# Patient Record
Sex: MALE | Race: WHITE | NOT HISPANIC OR LATINO | Employment: OTHER | URBAN - METROPOLITAN AREA
[De-identification: names, ages, dates, MRNs, and addresses within clinical notes are randomized per-mention and may not be internally consistent; named-entity substitution may affect disease eponyms.]

---

## 2017-07-24 ENCOUNTER — OFFICE VISIT (OUTPATIENT)
Dept: UROLOGY | Facility: CLINIC | Age: 57
End: 2017-07-24
Payer: COMMERCIAL

## 2017-07-24 VITALS
DIASTOLIC BLOOD PRESSURE: 73 MMHG | HEART RATE: 70 BPM | HEIGHT: 72 IN | SYSTOLIC BLOOD PRESSURE: 123 MMHG | WEIGHT: 160.94 LBS | BODY MASS INDEX: 21.8 KG/M2

## 2017-07-24 DIAGNOSIS — N40.1 HYPERTROPHY OF PROSTATE WITH URINARY OBSTRUCTION AND OTHER LOWER URINARY TRACT SYMPTOMS (LUTS): ICD-10-CM

## 2017-07-24 DIAGNOSIS — R39.12 WEAK URINE STREAM: ICD-10-CM

## 2017-07-24 DIAGNOSIS — R97.20 ELEVATED PSA: Primary | ICD-10-CM

## 2017-07-24 DIAGNOSIS — N13.8 HYPERTROPHY OF PROSTATE WITH URINARY OBSTRUCTION AND OTHER LOWER URINARY TRACT SYMPTOMS (LUTS): ICD-10-CM

## 2017-07-24 LAB
BILIRUB SERPL-MCNC: NORMAL MG/DL
BLOOD URINE, POC: NORMAL
COLOR, POC UA: YELLOW
GLUCOSE UR QL STRIP: NORMAL
KETONES UR QL STRIP: NORMAL
LEUKOCYTE ESTERASE URINE, POC: NORMAL
NITRITE, POC UA: NORMAL
PH, POC UA: 5
PROTEIN, POC: NORMAL
SPECIFIC GRAVITY, POC UA: 1.03
UROBILINOGEN, POC UA: NORMAL

## 2017-07-24 PROCEDURE — 99999 PR PBB SHADOW E&M-EST. PATIENT-LVL III: CPT | Mod: PBBFAC,,, | Performed by: UROLOGY

## 2017-07-24 PROCEDURE — 99214 OFFICE O/P EST MOD 30 MIN: CPT | Mod: 25,S$GLB,, | Performed by: UROLOGY

## 2017-07-24 PROCEDURE — 81002 URINALYSIS NONAUTO W/O SCOPE: CPT | Mod: S$GLB,,, | Performed by: UROLOGY

## 2017-07-24 RX ORDER — POTASSIUM CHLORIDE 750 MG/1
10 CAPSULE, EXTENDED RELEASE ORAL EVERY MORNING
COMMUNITY
End: 2023-01-06 | Stop reason: ALTCHOICE

## 2017-07-24 RX ORDER — FINASTERIDE 5 MG/1
5 TABLET, FILM COATED ORAL DAILY
Qty: 30 TABLET | Refills: 12 | Status: SHIPPED | OUTPATIENT
Start: 2017-07-24 | End: 2021-07-23

## 2017-07-24 RX ORDER — LANOLIN ALCOHOL/MO/W.PET/CERES
1 CREAM (GRAM) TOPICAL 2 TIMES DAILY
COMMUNITY
End: 2021-12-02

## 2017-07-24 NOTE — PROGRESS NOTES
Subjective:       Patient ID: Lore Viramontes is a 57 y.o. male.    Chief Complaint: Follow-up    HPI     A 57-year-old male with a history of elevated PSA.  He underwent a benign prostate biopsy in February 2014 his PSA at that time was 5.3.  He also has BPH and his AUA symptom score is 23 he has been on Uroxatral which was initially effective but now he is having worsening problems.  His most recent PSA is 3.7.   He also has ED and was previously given a trial of Cialis but he had severe flushing and he is no longer taking any ED meds.  Urine dipstick shows negative for all components.    Review of Systems   Constitutional: Negative for fever.   Genitourinary: Negative for dysuria and hematuria.       Objective:      Physical Exam   Constitutional: He is oriented to person, place, and time. He appears well-developed and well-nourished.   HENT:   Head: Normocephalic and atraumatic.   Eyes: Conjunctivae are normal.   Cardiovascular: Normal rate.    Pulmonary/Chest: Effort normal.   Abdominal: Soft.   Genitourinary: Rectal exam shows no mass and anal tone normal. Prostate is enlarged (40g s/s/a). Prostate is not tender.   Musculoskeletal: Normal range of motion.   Neurological: He is alert and oriented to person, place, and time.   Skin: Skin is warm and dry. No rash noted.   Psychiatric: He has a normal mood and affect.   Vitals reviewed.      Assessment:       1. Elevated PSA    2. Hypertrophy of prostate with urinary obstruction and other lower urinary tract symptoms (LUTS)    3. Weak urine stream        Plan:       Elevated PSA  -     POCT URINE DIPSTICK WITHOUT MICROSCOPE  -     Prostate Specific Antigen, Diagnostic; Future; Expected date: 07/24/2017  -     Cystoscopy; Future    Hypertrophy of prostate with urinary obstruction and other lower urinary tract symptoms (LUTS)  -     finasteride (PROSCAR) 5 mg tablet; Take 1 tablet (5 mg total) by mouth once daily.  Dispense: 30 tablet; Refill: 12  -     Cystoscopy;  Future    Weak urine stream  -     Cystoscopy; Future      Add finasteride.  F/u for cysto.

## 2017-08-07 ENCOUNTER — PROCEDURE VISIT (OUTPATIENT)
Dept: UROLOGY | Facility: CLINIC | Age: 57
End: 2017-08-07
Payer: COMMERCIAL

## 2017-08-07 VITALS
BODY MASS INDEX: 22.09 KG/M2 | WEIGHT: 163.13 LBS | SYSTOLIC BLOOD PRESSURE: 131 MMHG | DIASTOLIC BLOOD PRESSURE: 79 MMHG | HEIGHT: 72 IN | HEART RATE: 63 BPM

## 2017-08-07 DIAGNOSIS — N40.1 HYPERTROPHY OF PROSTATE WITH URINARY OBSTRUCTION AND OTHER LOWER URINARY TRACT SYMPTOMS (LUTS): ICD-10-CM

## 2017-08-07 DIAGNOSIS — R97.20 ELEVATED PSA: ICD-10-CM

## 2017-08-07 DIAGNOSIS — N13.8 HYPERTROPHY OF PROSTATE WITH URINARY OBSTRUCTION AND OTHER LOWER URINARY TRACT SYMPTOMS (LUTS): ICD-10-CM

## 2017-08-07 DIAGNOSIS — R39.12 WEAK URINE STREAM: ICD-10-CM

## 2017-08-07 PROCEDURE — 52000 CYSTOURETHROSCOPY: CPT | Mod: S$GLB,,, | Performed by: UROLOGY

## 2017-08-07 PROCEDURE — 81002 URINALYSIS NONAUTO W/O SCOPE: CPT | Mod: S$GLB,,, | Performed by: UROLOGY

## 2017-08-07 NOTE — PROCEDURES
"Cystoscopy  Date/Time: 8/7/2017 4:33 PM  Performed by: REAGNA VALDEZ  Authorized by: REAGAN VALDEZ     Consent Done?:  Yes (Written)  Time out: Immediately prior to procedure a "time out" was called to verify the correct patient, procedure, equipment, support staff and site/side marked as required.    Indications: BPH    Position:  Supine  Anesthesia:  Lidocaine jelly  Patient sedated?: No    Preparation: Patient was prepped and draped in usual sterile fashion      Scope type:  Flexible cystoscope    Patient tolerance:  Patient tolerated the procedure well with no immediate complications      The flexible cystoscope was placed into the urethra and carefully advanced into the bladder.  A careful cystoscopic exam was then performed.  The entire bladder mucosa was systematically visualized.  Findings include moderate bladder wall trabeculation.  There were no lesions, masses foreign bodies or stones.   Each ureteral orifices were visualized and both had clear efflux of urine.   There was a moderate size intravesical prostate.  The cystoscope was then removed and I examined the entire length of the urethra.  There was moderate trilobar enlargement of the prostate otherwise the urethra appeared normal.  He tolerated the procedure well.  There were no complications    Impression:  Normal cystoscopy except BPH    Plan:  We discussed TURP which he will consider but he wants to continue medical therapy for now.  Continue Flomax and Finasteride.    "

## 2017-08-16 DIAGNOSIS — N40.1 BENIGN PROSTATIC HYPERPLASIA WITH URINARY OBSTRUCTION: ICD-10-CM

## 2017-08-16 DIAGNOSIS — N13.8 BENIGN PROSTATIC HYPERPLASIA WITH URINARY OBSTRUCTION: ICD-10-CM

## 2017-08-16 RX ORDER — ALFUZOSIN HYDROCHLORIDE 10 MG/1
10 TABLET, EXTENDED RELEASE ORAL NIGHTLY
Qty: 30 TABLET | Refills: 11 | Status: SHIPPED | OUTPATIENT
Start: 2017-08-16 | End: 2018-08-13 | Stop reason: SDUPTHER

## 2018-01-08 ENCOUNTER — LAB VISIT (OUTPATIENT)
Dept: LAB | Facility: HOSPITAL | Age: 58
End: 2018-01-08
Attending: UROLOGY
Payer: COMMERCIAL

## 2018-01-08 ENCOUNTER — OFFICE VISIT (OUTPATIENT)
Dept: UROLOGY | Facility: CLINIC | Age: 58
End: 2018-01-08
Payer: COMMERCIAL

## 2018-01-08 VITALS
HEART RATE: 69 BPM | BODY MASS INDEX: 22.25 KG/M2 | DIASTOLIC BLOOD PRESSURE: 64 MMHG | SYSTOLIC BLOOD PRESSURE: 120 MMHG | WEIGHT: 164.25 LBS | HEIGHT: 72 IN

## 2018-01-08 DIAGNOSIS — R97.20 ELEVATED PSA: Primary | ICD-10-CM

## 2018-01-08 DIAGNOSIS — N40.1 ENLARGED PROSTATE WITH URINARY OBSTRUCTION: ICD-10-CM

## 2018-01-08 DIAGNOSIS — N13.8 ENLARGED PROSTATE WITH URINARY OBSTRUCTION: ICD-10-CM

## 2018-01-08 DIAGNOSIS — R97.20 ELEVATED PSA: ICD-10-CM

## 2018-01-08 LAB
BILIRUB SERPL-MCNC: NORMAL MG/DL
BLOOD URINE, POC: NORMAL
COLOR, POC UA: YELLOW
COMPLEXED PSA SERPL-MCNC: 3.6 NG/ML
GLUCOSE UR QL STRIP: NORMAL
KETONES UR QL STRIP: NORMAL
LEUKOCYTE ESTERASE URINE, POC: NORMAL
NITRITE, POC UA: NORMAL
PH, POC UA: 6
PROTEIN, POC: NORMAL
SPECIFIC GRAVITY, POC UA: 1.03
UROBILINOGEN, POC UA: NORMAL

## 2018-01-08 PROCEDURE — 81002 URINALYSIS NONAUTO W/O SCOPE: CPT | Mod: S$GLB,,, | Performed by: UROLOGY

## 2018-01-08 PROCEDURE — 36415 COLL VENOUS BLD VENIPUNCTURE: CPT | Mod: PO

## 2018-01-08 PROCEDURE — 84153 ASSAY OF PSA TOTAL: CPT

## 2018-01-08 PROCEDURE — 99213 OFFICE O/P EST LOW 20 MIN: CPT | Mod: 25,S$GLB,, | Performed by: UROLOGY

## 2018-01-08 PROCEDURE — 99999 PR PBB SHADOW E&M-EST. PATIENT-LVL III: CPT | Mod: PBBFAC,,, | Performed by: UROLOGY

## 2018-01-08 NOTE — PROGRESS NOTES
Subjective:       Patient ID: Lore Viramontes is a 57 y.o. male.    Chief Complaint: Elevated PSA    HPI     A 57-year-old male with a history of elevated PSA.  He underwent a benign prostate biopsy in February 2014 his PSA at that time was 5.3.  He also has BPH and his AUA symptom score is 23 he has been on Uroxatral.  His symptoms worsened problems and we added Finasteride.  He took for a few months but now has discontinued.  For now he is overall satisfied.  His last PSA was 3.7 (08/2016).    Cystoscopy was remarkable for BPH.    Urine dipstick shows negative for all components.  PVR 31 ml    Review of Systems   Constitutional: Negative for fever.   Genitourinary: Negative for dysuria and hematuria.       Objective:      Physical Exam   Constitutional: He is oriented to person, place, and time. He appears well-developed and well-nourished.   Pulmonary/Chest: Effort normal.   Neurological: He is alert and oriented to person, place, and time.   Skin: No rash noted.   Psychiatric: He has a normal mood and affect.   Vitals reviewed.      Assessment:       1. Elevated PSA    2. Enlarged prostate with urinary obstruction        Plan:       Elevated PSA  -     POCT URINE DIPSTICK WITHOUT MICROSCOPE    Enlarged prostate with urinary obstruction      Update PSA.  Continue Uroxatral.  RTC 12 months

## 2018-08-13 DIAGNOSIS — N13.8 BENIGN PROSTATIC HYPERPLASIA WITH URINARY OBSTRUCTION: ICD-10-CM

## 2018-08-13 DIAGNOSIS — N40.1 BENIGN PROSTATIC HYPERPLASIA WITH URINARY OBSTRUCTION: ICD-10-CM

## 2018-08-13 RX ORDER — ALFUZOSIN HYDROCHLORIDE 10 MG/1
10 TABLET, EXTENDED RELEASE ORAL NIGHTLY
Qty: 30 TABLET | Refills: 11 | Status: SHIPPED | OUTPATIENT
Start: 2018-08-13 | End: 2019-08-15 | Stop reason: SDUPTHER

## 2019-08-15 DIAGNOSIS — N13.8 BENIGN PROSTATIC HYPERPLASIA WITH URINARY OBSTRUCTION: ICD-10-CM

## 2019-08-15 DIAGNOSIS — N40.1 BENIGN PROSTATIC HYPERPLASIA WITH URINARY OBSTRUCTION: ICD-10-CM

## 2019-08-16 RX ORDER — ALFUZOSIN HYDROCHLORIDE 10 MG/1
10 TABLET, EXTENDED RELEASE ORAL NIGHTLY
Qty: 30 TABLET | Refills: 11 | Status: SHIPPED | OUTPATIENT
Start: 2019-08-16 | End: 2020-07-16

## 2021-08-02 DIAGNOSIS — N40.1 BENIGN PROSTATIC HYPERPLASIA WITH URINARY OBSTRUCTION: ICD-10-CM

## 2021-08-02 DIAGNOSIS — N13.8 BENIGN PROSTATIC HYPERPLASIA WITH URINARY OBSTRUCTION: ICD-10-CM

## 2021-08-02 RX ORDER — ALFUZOSIN HYDROCHLORIDE 10 MG/1
10 TABLET, EXTENDED RELEASE ORAL NIGHTLY
Qty: 30 TABLET | Refills: 11 | Status: SHIPPED | OUTPATIENT
Start: 2021-08-02 | End: 2022-08-04

## 2021-08-04 PROBLEM — R07.89 CHEST TIGHTNESS: Status: ACTIVE | Noted: 2021-08-04

## 2021-10-20 PROBLEM — I10 ESSENTIAL HYPERTENSION: Status: ACTIVE | Noted: 2021-10-20

## 2021-10-20 PROBLEM — I25.10 NON-OCCLUSIVE CORONARY ARTERY DISEASE REQUIRING DRUG THERAPY: Status: ACTIVE | Noted: 2021-10-20

## 2021-10-20 PROBLEM — E66.811 OBESITY (BMI 30.0-34.9): Status: ACTIVE | Noted: 2021-10-20

## 2021-10-20 PROBLEM — E66.9 OBESITY (BMI 30.0-34.9): Status: ACTIVE | Noted: 2021-10-20

## 2021-10-20 PROBLEM — E78.2 MIXED HYPERLIPIDEMIA: Status: ACTIVE | Noted: 2021-10-20

## 2021-10-20 PROBLEM — R07.89 CHEST TIGHTNESS: Status: RESOLVED | Noted: 2021-08-04 | Resolved: 2021-10-20

## 2021-10-20 PROBLEM — Z98.890 PERSONAL HISTORY OF SPINE SURGERY: Status: ACTIVE | Noted: 2021-10-20

## 2021-12-22 PROBLEM — L98.8 PILONIDAL DISEASE: Status: ACTIVE | Noted: 2021-12-22

## 2022-06-21 ENCOUNTER — OFFICE VISIT (OUTPATIENT)
Dept: OPHTHALMOLOGY | Facility: CLINIC | Age: 62
End: 2022-06-21
Payer: COMMERCIAL

## 2022-06-21 DIAGNOSIS — Z96.1 PSEUDOPHAKIA: ICD-10-CM

## 2022-06-21 DIAGNOSIS — H52.7 REFRACTIVE ERROR: ICD-10-CM

## 2022-06-21 DIAGNOSIS — H35.3131 EARLY DRY STAGE NONEXUDATIVE AGE-RELATED MACULAR DEGENERATION OF BOTH EYES: Primary | ICD-10-CM

## 2022-06-21 PROCEDURE — 1160F RVW MEDS BY RX/DR IN RCRD: CPT | Mod: CPTII,S$GLB,, | Performed by: OPHTHALMOLOGY

## 2022-06-21 PROCEDURE — 92004 PR EYE EXAM, NEW PATIENT,COMPREHESV: ICD-10-PCS | Mod: S$GLB,,, | Performed by: OPHTHALMOLOGY

## 2022-06-21 PROCEDURE — 99999 PR PBB SHADOW E&M-EST. PATIENT-LVL III: ICD-10-PCS | Mod: PBBFAC,,, | Performed by: OPHTHALMOLOGY

## 2022-06-21 PROCEDURE — 1159F MED LIST DOCD IN RCRD: CPT | Mod: CPTII,S$GLB,, | Performed by: OPHTHALMOLOGY

## 2022-06-21 PROCEDURE — 92004 COMPRE OPH EXAM NEW PT 1/>: CPT | Mod: S$GLB,,, | Performed by: OPHTHALMOLOGY

## 2022-06-21 PROCEDURE — 1159F PR MEDICATION LIST DOCUMENTED IN MEDICAL RECORD: ICD-10-PCS | Mod: CPTII,S$GLB,, | Performed by: OPHTHALMOLOGY

## 2022-06-21 PROCEDURE — 99999 PR PBB SHADOW E&M-EST. PATIENT-LVL III: CPT | Mod: PBBFAC,,, | Performed by: OPHTHALMOLOGY

## 2022-06-21 PROCEDURE — 1160F PR REVIEW ALL MEDS BY PRESCRIBER/CLIN PHARMACIST DOCUMENTED: ICD-10-PCS | Mod: CPTII,S$GLB,, | Performed by: OPHTHALMOLOGY

## 2022-06-21 RX ORDER — FUROSEMIDE 20 MG/1
20 TABLET ORAL DAILY
Qty: 30 TABLET | Refills: 11
Start: 2022-06-21 | End: 2023-01-03

## 2022-06-21 NOTE — PROGRESS NOTES
===============================  Date today is 6/21/2022  Lore Viramontes is a 62 y.o. male  Last visit [facility]: :Visit date not found   Last visit eye dept. Visit date not found    Corrected distance visual acuity was 20/40 in the right eye and 20/30 in the left eye.  Tonometry     Tonometry (Applanation, 10:37 AM)       Right Left    Pressure 16 16              Not recorded       Manifest Refraction     Manifest Refraction       Sphere Cylinder Axis    Right -0.25 +0.75 165    Left -0.50 +1.00 015              Not recorded       Chief Complaint   Patient presents with    Blurred Vision     Pt states it seems like he is looking thru a film ou.       Problem List Items Addressed This Visit    None     Visit Diagnoses     Early dry stage nonexudative age-related macular degeneration of both eyes    -  Primary    Pseudophakia        Refractive error              ________________  6/21/2022 today      PCIOL OU with trace PCM OD  Still symptomatic after phaco OU  OCT looks perfect OD shows just a slight loss of definition of ganglion layer compared to OS  C/o trouble with dimness in vision- will need a VF  Macula photostress/Dry SMD  Recommend artificial tears 3-4x daily  Update glasses today  K map next visit    RTC 2-3 months with 30-2 VF review if glasses don't help  Instructed to call 24/7 for any worsening of vision or symptoms. Check OU daily.   Gave my office and cell phone number.    .      ===============================

## 2022-08-05 DIAGNOSIS — N40.1 BENIGN PROSTATIC HYPERPLASIA WITH URINARY OBSTRUCTION: ICD-10-CM

## 2022-08-05 DIAGNOSIS — N13.8 BENIGN PROSTATIC HYPERPLASIA WITH URINARY OBSTRUCTION: ICD-10-CM

## 2022-08-05 RX ORDER — ALFUZOSIN HYDROCHLORIDE 10 MG/1
10 TABLET, EXTENDED RELEASE ORAL NIGHTLY
Qty: 30 TABLET | Refills: 11 | Status: SHIPPED | OUTPATIENT
Start: 2022-08-05 | End: 2023-01-03

## 2022-11-08 ENCOUNTER — OFFICE VISIT (OUTPATIENT)
Dept: OPHTHALMOLOGY | Facility: CLINIC | Age: 62
End: 2022-11-08
Payer: COMMERCIAL

## 2022-11-08 DIAGNOSIS — H53.60 NYCTALOPIA: ICD-10-CM

## 2022-11-08 DIAGNOSIS — Z96.1 PSEUDOPHAKIA: ICD-10-CM

## 2022-11-08 DIAGNOSIS — H35.3131 EARLY DRY STAGE NONEXUDATIVE AGE-RELATED MACULAR DEGENERATION OF BOTH EYES: Primary | ICD-10-CM

## 2022-11-08 PROCEDURE — 92083 EXTENDED VISUAL FIELD XM: CPT | Mod: S$GLB,,, | Performed by: OPHTHALMOLOGY

## 2022-11-08 PROCEDURE — 92083 HUMPHREY VISUAL FIELD - OU - BOTH EYES: ICD-10-PCS | Mod: S$GLB,,, | Performed by: OPHTHALMOLOGY

## 2022-11-08 PROCEDURE — 99999 PR PBB SHADOW E&M-EST. PATIENT-LVL III: CPT | Mod: PBBFAC,,, | Performed by: OPHTHALMOLOGY

## 2022-11-08 PROCEDURE — 1160F PR REVIEW ALL MEDS BY PRESCRIBER/CLIN PHARMACIST DOCUMENTED: ICD-10-PCS | Mod: CPTII,S$GLB,, | Performed by: OPHTHALMOLOGY

## 2022-11-08 PROCEDURE — 1160F RVW MEDS BY RX/DR IN RCRD: CPT | Mod: CPTII,S$GLB,, | Performed by: OPHTHALMOLOGY

## 2022-11-08 PROCEDURE — 76514 ECHO EXAM OF EYE THICKNESS: CPT | Mod: S$GLB,,, | Performed by: OPHTHALMOLOGY

## 2022-11-08 PROCEDURE — 92134 POSTERIOR SEGMENT OCT RETINA (OCULAR COHERENCE TOMOGRAPHY)-BOTH EYES: ICD-10-PCS | Mod: S$GLB,,, | Performed by: OPHTHALMOLOGY

## 2022-11-08 PROCEDURE — 99214 PR OFFICE/OUTPT VISIT, EST, LEVL IV, 30-39 MIN: ICD-10-PCS | Mod: S$GLB,,, | Performed by: OPHTHALMOLOGY

## 2022-11-08 PROCEDURE — 1159F MED LIST DOCD IN RCRD: CPT | Mod: CPTII,S$GLB,, | Performed by: OPHTHALMOLOGY

## 2022-11-08 PROCEDURE — 76514 ULTRASOUND PACHYMETRY: ICD-10-PCS | Mod: S$GLB,,, | Performed by: OPHTHALMOLOGY

## 2022-11-08 PROCEDURE — 99999 PR PBB SHADOW E&M-EST. PATIENT-LVL III: ICD-10-PCS | Mod: PBBFAC,,, | Performed by: OPHTHALMOLOGY

## 2022-11-08 PROCEDURE — 99214 OFFICE O/P EST MOD 30 MIN: CPT | Mod: S$GLB,,, | Performed by: OPHTHALMOLOGY

## 2022-11-08 PROCEDURE — 1159F PR MEDICATION LIST DOCUMENTED IN MEDICAL RECORD: ICD-10-PCS | Mod: CPTII,S$GLB,, | Performed by: OPHTHALMOLOGY

## 2022-11-08 PROCEDURE — 92134 CPTRZ OPH DX IMG PST SGM RTA: CPT | Mod: S$GLB,,, | Performed by: OPHTHALMOLOGY

## 2022-11-08 NOTE — PROGRESS NOTES
===============================  Date today is 11/8/2022  Lore Viramontes is a 62 y.o. male  Last visit [facility]: :6/21/2022   Last visit eye dept. 6/21/2022    Visual acuity was not recorded.  Tonometry       Tonometry (Applanation, 8:26 AM)         Right Left    Pressure 20 18                  Not recorded       Not recorded       Not recorded       Chief Complaint   Patient presents with    Macular Degeneration     Yearly exam     HPI     Macular Degeneration     Additional comments: Yearly exam           Comments    Pciol ou  2.DRY SMD          Last edited by BRIANA Amaya on 11/8/2022  8:23 AM.      Problem List Items Addressed This Visit    None  Visit Diagnoses       Early dry stage nonexudative age-related macular degeneration of both eyes    -  Primary    Pseudophakia        Nyctalopia              Instructed to call 24/7 for any worsening of vision, visual distortion or pain.  Check OU independently daily.    Gave my office and personal cell phone number.  ________________  11/8/2022 today  Lore Viramontes      VF today shows 10-15 degree constriction OU                   OD                                              OS    C/o nyctolopia x 20 years, progressive, do genetic testing  Narrowed field  Disc contour ok  RNFL low  CCT today 540/535 (+1)  MOCT ok      PCIOL OU  Pale superiorly disc OD 0.4  Macula normal OD  Consider ERG, HANS  RPE normal, no bone spicules  Could be bilateral optic neuritis  Diffuse disc pallor OS 0.2  Consult Dr. Banerjee for ERG, consider HANS  Symptoms and visual field compatible with RP  20 year hx profound nyctolopia  ?disc pallor, ? Narrowed arterioles  OCT perfect  Prev MRI >10 years ago  for eye problems was normal  This is clearly progressive  Review charcot genesis tooth   Consider repeat mri      RTC for consult with Dr. Banerjee, 8 weeks with me  Instructed to call 24/7 for any worsening of vision or symptoms. Check OU daily.   Gave my office and cell phone  number.    =============================

## 2023-01-03 ENCOUNTER — OFFICE VISIT (OUTPATIENT)
Dept: OPHTHALMOLOGY | Facility: CLINIC | Age: 63
End: 2023-01-03
Payer: COMMERCIAL

## 2023-01-03 DIAGNOSIS — H35.3131 EARLY DRY STAGE NONEXUDATIVE AGE-RELATED MACULAR DEGENERATION OF BOTH EYES: Primary | ICD-10-CM

## 2023-01-03 DIAGNOSIS — Z96.1 PSEUDOPHAKIA: ICD-10-CM

## 2023-01-03 PROCEDURE — 92014 PR EYE EXAM, EST PATIENT,COMPREHESV: ICD-10-PCS | Mod: S$GLB,,, | Performed by: OPHTHALMOLOGY

## 2023-01-03 PROCEDURE — 92014 COMPRE OPH EXAM EST PT 1/>: CPT | Mod: S$GLB,,, | Performed by: OPHTHALMOLOGY

## 2023-01-03 PROCEDURE — 99999 PR PBB SHADOW E&M-EST. PATIENT-LVL II: ICD-10-PCS | Mod: PBBFAC,,, | Performed by: OPHTHALMOLOGY

## 2023-01-03 PROCEDURE — 99999 PR PBB SHADOW E&M-EST. PATIENT-LVL II: CPT | Mod: PBBFAC,,, | Performed by: OPHTHALMOLOGY

## 2023-01-03 RX ORDER — CLONAZEPAM 1 MG/1
1 TABLET ORAL NIGHTLY PRN
COMMUNITY
Start: 2022-10-28

## 2023-01-03 NOTE — PROGRESS NOTES
===============================  Date today is 1/3/2023  Lore Viramontes is a 62 y.o. male  Last visit [facility]: :11/8/2022   Last visit eye dept. 11/8/2022    Corrected distance visual acuity was 20/30 in the right eye and 20/30 in the left eye.  Tonometry       Tonometry (Applanation, 9:39 AM)         Right Left    Pressure 16 16                  Not recorded       Not recorded       Not recorded       Chief Complaint   Patient presents with    Macular Degeneration     8 weeks check up     HPI     Macular Degeneration     Additional comments: 8 weeks check up           Comments    Pciol ou  2.DRY SMD          Last edited by Naheed Dejesus on 1/3/2023  9:00 AM.      Problem List Items Addressed This Visit    None    Instructed to call 24/7 for any worsening of vision, visual distortion or pain.  Check OU independently daily.    Gave my office and personal cell phone number.  ________________  1/3/2023 today  Lore Viramontes  :  One Pathogenic variant identified in TMEM67. TMEM67 is associated with autosomal recessive Christ  syndrome and related disorders.  One Likely Pathogenic variant identified in MPDZ. MPDZ is associated with autosomal recessive  congenital hydrocephalus.  One Likely Pathogenic variant identified in TRPM1. TRPM1 is associated with autosomal recessive  congenital stationary night blindness.      Genetic testing en locus associated with congential stationary blindness  Need genetic counseling with [facility] and [facility]   Consider visit with brenton maguire    Consider  erg eog and malick    Check charcot   Noemy CSB     Difficu;yt seeing froends    Return to clinic in 2-3 months     =============================

## 2023-01-14 PROBLEM — G47.33 OBSTRUCTIVE SLEEP APNEA: Status: ACTIVE | Noted: 2023-01-14

## 2023-01-14 PROBLEM — J34.3 HYPERTROPHY OF NASAL TURBINATES: Status: ACTIVE | Noted: 2023-01-14

## 2023-01-14 PROBLEM — J34.2 DEVIATED SEPTUM: Status: ACTIVE | Noted: 2023-01-14

## 2023-03-13 ENCOUNTER — OFFICE VISIT (OUTPATIENT)
Dept: OPHTHALMOLOGY | Facility: CLINIC | Age: 63
End: 2023-03-13
Payer: MEDICARE

## 2023-03-13 DIAGNOSIS — H53.60 NYCTALOPIA: ICD-10-CM

## 2023-03-13 DIAGNOSIS — H35.3131 EARLY DRY STAGE NONEXUDATIVE AGE-RELATED MACULAR DEGENERATION OF BOTH EYES: Primary | ICD-10-CM

## 2023-03-13 DIAGNOSIS — G60.0 CHARCOT-MARIE-TOOTH DISEASE: ICD-10-CM

## 2023-03-13 DIAGNOSIS — H51.9 UNSPECIFIED DISORDER OF BINOCULAR MOVEMENT: ICD-10-CM

## 2023-03-13 DIAGNOSIS — H53.469 QUADRANTANOPIA, UNSPECIFIED LATERALITY: ICD-10-CM

## 2023-03-13 DIAGNOSIS — Z96.1 PSEUDOPHAKIA: ICD-10-CM

## 2023-03-13 PROCEDURE — 92014 COMPRE OPH EXAM EST PT 1/>: CPT | Mod: S$PBB,,, | Performed by: OPHTHALMOLOGY

## 2023-03-13 PROCEDURE — 92014 PR EYE EXAM, EST PATIENT,COMPREHESV: ICD-10-PCS | Mod: S$PBB,,, | Performed by: OPHTHALMOLOGY

## 2023-03-13 PROCEDURE — 92134 CPTRZ OPH DX IMG PST SGM RTA: CPT | Mod: PBBFAC | Performed by: OPHTHALMOLOGY

## 2023-03-13 PROCEDURE — 99999 PR PBB SHADOW E&M-EST. PATIENT-LVL III: CPT | Mod: PBBFAC,,, | Performed by: OPHTHALMOLOGY

## 2023-03-13 PROCEDURE — 99213 OFFICE O/P EST LOW 20 MIN: CPT | Mod: PBBFAC | Performed by: OPHTHALMOLOGY

## 2023-03-13 PROCEDURE — 99999 PR PBB SHADOW E&M-EST. PATIENT-LVL III: ICD-10-PCS | Mod: PBBFAC,,, | Performed by: OPHTHALMOLOGY

## 2023-03-13 PROCEDURE — 92134 POSTERIOR SEGMENT OCT RETINA (OCULAR COHERENCE TOMOGRAPHY)-BOTH EYES: ICD-10-PCS | Mod: 26,S$PBB,, | Performed by: OPHTHALMOLOGY

## 2023-03-13 NOTE — PROGRESS NOTES
===============================  Date today is 3/13/2023  Lore Viramontes is a 63 y.o. male  Last visit [facility]: :1/3/2023   Last visit eye dept. 1/3/2023    Uncorrected distance visual acuity was 20/40 in the right eye and 20/40 in the left eye.  Tonometry       Tonometry (Applanation, 9:50 AM)         Right Left    Pressure 10 10                  Not recorded       Not recorded       Not recorded       Chief Complaint   Patient presents with    Macular Degeneration     3 month check up/   Pt states that his eyes have been blurry,  hard to see the writing on his 65 inch TV.         Problem List Items Addressed This Visit    None  Visit Diagnoses       Early dry stage nonexudative age-related macular degeneration of both eyes    -  Primary    Relevant Orders    Posterior Segment OCT Retina-Both eyes (Completed)    Pseudophakia        Nyctalopia        Relevant Orders    MRI Brain W WO Contrast    Creatinine, serum    BUN    Charcot-Madelaine-Tooth disease        Relevant Orders    MRI Brain W WO Contrast    Unspecified disorder of binocular movement        Quadrantanopia, unspecified laterality        Relevant Orders    MRI Brain W WO Contrast    Creatinine, serum    BUN          Instructed to call 24/7 for any worsening of vision, visual distortion or pain.  Check OU independently daily.    Gave my office and personal cell phone number.  ________________  3/13/2023 today  Lore Viramontes    C/o nyctolopia x 20 years, progressive, do genetic testing  Narrowed field  Disc contour ok  RNFL low  CCT today 540/535 (+1)  MOCT ok  One Pathogenic variant identified in TMEM67. TMEM67 is associated with autosomal recessive Christ  syndrome and related disorders.  One Likely Pathogenic variant identified in MPDZ. MPDZ is associated with autosomal recessive  congenital hydrocephalus.  One Likely Pathogenic variant identified in TRPM1. TRPM1 is associated with autosomal recessive  congenital stationary night blindness.        Genetic testing en locus associated with congential stationary blindness  Need genetic counseling with [facility]   Consider visit with brenton maguire     Consider  erg eog and malick        Rec eval ocno with Dr. Banerjee    Call [facility] re above    Recommend MRI to rule out circulatory issues  PCIOL OU  Diffuse pallor OD   Pale disc OS  Consider MALICK at [facility]   Also consider referral to Dr. Maguire in [place]  OCT ok today    RTC 2 months to review findings of MALICK and Dr. Banerjee  Instructed to call 24/7 for any worsening of vision or symptoms. Check OU daily.   Gave my office and cell phone number.     ============================

## 2023-04-03 ENCOUNTER — OFFICE VISIT (OUTPATIENT)
Dept: OPHTHALMOLOGY | Facility: CLINIC | Age: 63
End: 2023-04-03
Payer: MEDICARE

## 2023-04-03 DIAGNOSIS — H53.63: ICD-10-CM

## 2023-04-03 PROCEDURE — 99999 PR PBB SHADOW E&M-EST. PATIENT-LVL III: ICD-10-PCS | Mod: PBBFAC,,, | Performed by: OPHTHALMOLOGY

## 2023-04-03 PROCEDURE — 99213 OFFICE O/P EST LOW 20 MIN: CPT | Mod: PBBFAC,PO | Performed by: OPHTHALMOLOGY

## 2023-04-03 PROCEDURE — 92201 OPSCPY EXTND RTA DRAW UNI/BI: CPT | Mod: PBBFAC,PO | Performed by: OPHTHALMOLOGY

## 2023-04-03 PROCEDURE — 92014 PR EYE EXAM, EST PATIENT,COMPREHESV: ICD-10-PCS | Mod: S$PBB,,, | Performed by: OPHTHALMOLOGY

## 2023-04-03 PROCEDURE — 99999 PR PBB SHADOW E&M-EST. PATIENT-LVL III: CPT | Mod: PBBFAC,,, | Performed by: OPHTHALMOLOGY

## 2023-04-03 PROCEDURE — 92014 COMPRE OPH EXAM EST PT 1/>: CPT | Mod: S$PBB,,, | Performed by: OPHTHALMOLOGY

## 2023-04-03 PROCEDURE — 92201 PR OPHTHALMOSCOPY, EXT, W/RET DRAW/SCLERAL DEPR, I&R, UNI/BI: ICD-10-PCS | Mod: S$PBB,,, | Performed by: OPHTHALMOLOGY

## 2023-04-03 PROCEDURE — 92201 OPSCPY EXTND RTA DRAW UNI/BI: CPT | Mod: S$PBB,,, | Performed by: OPHTHALMOLOGY

## 2023-04-03 NOTE — PROGRESS NOTES
HPI     Macular Degeneration     Additional comments: AMD Eval- Dr. Love           Comments    Pt states his va has been poor for about 4 years now. No flashes. No   floaters. He has trouble with recognizing faces, close up.      OCT - no ME OU      A/P    Longstanding nyctalopia  Had Genetic testing with [facility]:  One Pathogenic variant identified in TMEM67. TMEM67 is associated with autosomal recessive Christ  syndrome and related disorders.  One Likely Pathogenic variant identified in MPDZ. MPDZ is associated with autosomal recessive  congenital hydrocephalus.  One Likely Pathogenic variant identified in TRPM1. TRPM1 is associated with autosomal recessive  congenital stationary night blindness    Symptomatically behaving like CSNB  (MPDZ and TMEM67 js hydroceph/Ciliopathy like Christ's respectively not likely in an otherwise healthy 64 yo)    Having some increased difficutly with some age related deterioration in function on top of that.      No current trials for TRPM1    2. PCIOL OU    Can FU in hereditary retinal disease clinic in 6-12 months or FU with Dr. Love depending on convenience.

## 2023-04-11 ENCOUNTER — HOSPITAL ENCOUNTER (OUTPATIENT)
Dept: RADIOLOGY | Facility: HOSPITAL | Age: 63
Discharge: HOME | End: 2023-04-11
Attending: OPHTHALMOLOGY
Payer: MEDICARE

## 2023-04-11 DIAGNOSIS — H53.60 NYCTALOPIA: ICD-10-CM

## 2023-04-11 DIAGNOSIS — G60.0 CHARCOT-MARIE-TOOTH DISEASE: ICD-10-CM

## 2023-04-11 DIAGNOSIS — H53.469 QUADRANTANOPIA, UNSPECIFIED LATERALITY: ICD-10-CM

## 2023-04-11 PROCEDURE — 70543 MRI ORBT/FAC/NCK W/O &W/DYE: CPT | Mod: TC

## 2023-04-11 PROCEDURE — 25500020 PHARM REV CODE 255: Performed by: OPHTHALMOLOGY

## 2023-04-11 PROCEDURE — 70543 MRI ORBITS W W/O CONTRAST: ICD-10-PCS | Mod: 26,,, | Performed by: RADIOLOGY

## 2023-04-11 PROCEDURE — 70543 MRI ORBT/FAC/NCK W/O &W/DYE: CPT | Mod: 26,,, | Performed by: RADIOLOGY

## 2023-04-11 PROCEDURE — A9585 GADOBUTROL INJECTION: HCPCS | Performed by: OPHTHALMOLOGY

## 2023-04-11 RX ORDER — GADOBUTROL 604.72 MG/ML
10 INJECTION INTRAVENOUS
Status: COMPLETED | OUTPATIENT
Start: 2023-04-11 | End: 2023-04-11

## 2023-04-11 RX ADMIN — GADOBUTROL 10 ML: 604.72 INJECTION INTRAVENOUS at 09:04

## 2023-05-12 NOTE — PROGRESS NOTES
===============================  Date today is 5/15/2023  Lore Viramontes is a 63 y.o. male  Last visit [facility]: :3/13/2023   Last visit eye dept. 3/13/2023    Corrected distance visual acuity was 20/50 in the right eye and 20/50 in the left eye.  Tonometry       Tonometry (Icare, 10:26 AM)         Right Left    Pressure 16 18                  Not recorded       Not recorded       Not recorded       Chief Complaint   Patient presents with     Hereditary retinal disease      Patient states his vision seems to be a little worse even with his glasses. Patient states letters are running together. Patient states his eyes are still liv. Patient denies pain and discomfort.        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  Check OU independently daily.    Gave my office and personal cell phone number.  ________________  5/15/2023 today  Lore Viramontes    Inherited   Csnb  Consider [facility] - but this has elizabeth done    Mr aisha and lakeisha  Rtc  6-12 months   Did low vision work today    RTC 6-12 months  Instructed to call 24/7 for any worsening of vision or symptoms. Check OU daily.   Gave my office and cell phone number.    =============================

## 2023-05-15 ENCOUNTER — OFFICE VISIT (OUTPATIENT)
Dept: OPHTHALMOLOGY | Facility: CLINIC | Age: 63
End: 2023-05-15
Payer: MEDICARE

## 2023-05-15 DIAGNOSIS — Z96.1 PSEUDOPHAKIA: ICD-10-CM

## 2023-05-15 DIAGNOSIS — H53.469 QUADRANTANOPIA, UNSPECIFIED LATERALITY: ICD-10-CM

## 2023-05-15 DIAGNOSIS — H53.60 NYCTALOPIA: ICD-10-CM

## 2023-05-15 DIAGNOSIS — H53.63: Primary | ICD-10-CM

## 2023-05-15 DIAGNOSIS — H35.3131 EARLY DRY STAGE NONEXUDATIVE AGE-RELATED MACULAR DEGENERATION OF BOTH EYES: ICD-10-CM

## 2023-05-15 DIAGNOSIS — G60.0 CHARCOT-MARIE-TOOTH DISEASE: ICD-10-CM

## 2023-05-15 PROCEDURE — 99999 PR PBB SHADOW E&M-EST. PATIENT-LVL III: ICD-10-PCS | Mod: PBBFAC,,, | Performed by: OPHTHALMOLOGY

## 2023-05-15 PROCEDURE — 99213 OFFICE O/P EST LOW 20 MIN: CPT | Mod: PBBFAC | Performed by: OPHTHALMOLOGY

## 2023-05-15 PROCEDURE — 92134 POSTERIOR SEGMENT OCT RETINA (OCULAR COHERENCE TOMOGRAPHY)-BOTH EYES: ICD-10-PCS | Mod: 26,S$PBB,, | Performed by: OPHTHALMOLOGY

## 2023-05-15 PROCEDURE — 92014 COMPRE OPH EXAM EST PT 1/>: CPT | Mod: S$PBB,,, | Performed by: OPHTHALMOLOGY

## 2023-05-15 PROCEDURE — 99999 PR PBB SHADOW E&M-EST. PATIENT-LVL III: CPT | Mod: PBBFAC,,, | Performed by: OPHTHALMOLOGY

## 2023-05-15 PROCEDURE — 92014 PR EYE EXAM, EST PATIENT,COMPREHESV: ICD-10-PCS | Mod: S$PBB,,, | Performed by: OPHTHALMOLOGY

## 2023-05-15 PROCEDURE — 92134 CPTRZ OPH DX IMG PST SGM RTA: CPT | Mod: PBBFAC | Performed by: OPHTHALMOLOGY

## 2023-05-15 RX ORDER — KRILL/OM-3/DHA/EPA/PHOSPHO/AST 1000-170MG
CAPSULE ORAL
COMMUNITY

## 2023-05-15 RX ORDER — TETRACYCLINE HCL 500 MG
CAPSULE ORAL
COMMUNITY
End: 2023-06-12 | Stop reason: CLARIF

## 2023-05-15 RX ORDER — ACETAMINOPHEN 500 MG
TABLET ORAL DAILY
COMMUNITY

## 2023-05-15 RX ORDER — UBIDECARENONE 30 MG
30 CAPSULE ORAL 3 TIMES DAILY
COMMUNITY

## 2023-05-15 RX ORDER — PNV NO.95/FERROUS FUM/FOLIC AC 28MG-0.8MG
100 TABLET ORAL DAILY
COMMUNITY

## 2023-05-15 RX ORDER — VITAMIN B COMPLEX
1 CAPSULE ORAL DAILY
COMMUNITY

## 2023-05-15 RX ORDER — MAGNESIUM SULFATE 100 %
400 CRYSTALS MISCELLANEOUS DAILY
COMMUNITY
End: 2023-06-12 | Stop reason: CLARIF

## 2023-05-15 RX ORDER — CEPHRADINE 500 MG
1 CAPSULE ORAL DAILY
COMMUNITY

## 2023-06-14 PROBLEM — J32.0 CHRONIC MAXILLARY SINUSITIS: Status: ACTIVE | Noted: 2023-06-14

## 2023-06-14 PROBLEM — J32.2 CHRONIC ETHMOIDAL SINUSITIS: Status: ACTIVE | Noted: 2023-06-14

## 2023-06-16 PROBLEM — R04.0 EPISTAXIS: Status: ACTIVE | Noted: 2023-06-16

## 2024-11-12 ENCOUNTER — TELEPHONE (OUTPATIENT)
Dept: PULMONOLOGY | Facility: CLINIC | Age: 64
End: 2024-11-12
Payer: MEDICARE

## 2024-11-12 NOTE — TELEPHONE ENCOUNTER
Returned pt call r/t referral, explained to pt he has medicare and does not need referral, appt scheduled 11/13 1020, pt verbalized understanding.

## 2024-11-12 NOTE — TELEPHONE ENCOUNTER
----- Message from Stephanie sent at 11/12/2024  9:24 AM CST -----  Type: Needs Medical Advice  Who Called:  pt  Symptoms (please be specific):    How long has patient had these symptoms:    Pharmacy name and phone #:    Best Call Back Number: [phone]  Additional Information: pt is looking to see if a referral was sent to your office yet  Please call to advise   f/u with podiatry and plastic surgery/DC instructions

## 2024-11-13 ENCOUNTER — OFFICE VISIT (OUTPATIENT)
Dept: PULMONOLOGY | Facility: CLINIC | Age: 64
End: 2024-11-13
Payer: MEDICARE

## 2024-11-13 VITALS
SYSTOLIC BLOOD PRESSURE: 144 MMHG | DIASTOLIC BLOOD PRESSURE: 74 MMHG | WEIGHT: 205.38 LBS | OXYGEN SATURATION: 96 % | HEART RATE: 75 BPM | HEIGHT: 70 IN | BODY MASS INDEX: 29.4 KG/M2

## 2024-11-13 DIAGNOSIS — G47.33 OBSTRUCTIVE SLEEP APNEA (ADULT) (PEDIATRIC): ICD-10-CM

## 2024-11-13 DIAGNOSIS — R06.2 WHEEZES: ICD-10-CM

## 2024-11-13 DIAGNOSIS — G71.00 MUSCULAR DYSTROPHY: ICD-10-CM

## 2024-11-13 DIAGNOSIS — J98.6 DIAPHRAGM DYSFUNCTION: ICD-10-CM

## 2024-11-13 DIAGNOSIS — J44.9 CHRONIC OBSTRUCTIVE PULMONARY DISEASE, UNSPECIFIED COPD TYPE: Primary | ICD-10-CM

## 2024-11-13 DIAGNOSIS — R06.09 DOE (DYSPNEA ON EXERTION): ICD-10-CM

## 2024-11-13 PROCEDURE — 99203 OFFICE O/P NEW LOW 30 MIN: CPT | Mod: S$PBB,,, | Performed by: INTERNAL MEDICINE

## 2024-11-13 PROCEDURE — 99213 OFFICE O/P EST LOW 20 MIN: CPT | Mod: PBBFAC,PO | Performed by: INTERNAL MEDICINE

## 2024-11-13 PROCEDURE — 99999 PR PBB SHADOW E&M-EST. PATIENT-LVL III: CPT | Mod: PBBFAC,,, | Performed by: INTERNAL MEDICINE

## 2024-11-13 NOTE — PATIENT INSTRUCTIONS
You have wheezes and prior smoking -- copd or asthma considered but had problem inhaler in past.    You have had very short breath supine-- you have CMT -- breathing involvement occur but not likely.      May need to do ct chest, ddimer for blood clot...

## 2024-11-13 NOTE — PROGRESS NOTES
11/13/2024    Lore Viramontes  New Patient Consult    Chief Complaint   Patient presents with    Shortness of Breath       HPI:   11/13/22024 pt  ,. . Pt has osas with inspire placed last yr.  Pt had Charcot Madelaine Tooth --several family members with illness and no resp issues in family.  No cough but wheezes noted...    Pt used inhaler once     -- ppt could not breath fro few minutes .   Patient had been a pack-a-day smoker for many years -quit smoking in 2018.  Pt relates sob  worsens supine - ongoing for years and worsening    The patient will have episodes of shortness of breath and apply his old BiPAP device.  BiPAP does help his dyspnea.  He is still fairly active despite having CMT.    Nuclear stress test last yr had good ef...  echo was good in 2021       Pt denies episode sob, min chr pain but no swelling...      PFSH:  Past Medical History:   Diagnosis Date    Anticoagulant long-term use     Arthritis     Back pain     Blurred vision     Cancer     skin cancer to top of head    Cervical disc disorder     degeneration and herniation and stenosis    Charcot-Madelaine-Tooth atrophy     LEGS AND ARMS    Coronary artery disease     Gallbladder disease     Hearing loss of both ears     Hypertension     Neck pain on right side     radiation down right arm    Prostate enlargement     Ringing in ears     Sleep apnea     does not use CPAP         Past Surgical History:   Procedure Laterality Date    BACK SURGERY      CATARACT EXTRACTION  2016    CHOLECYSTECTOMY  2010    CYST REMOVAL  2018    rectal    ENDOSCOPIC NASAL CAUTERIZATION Bilateral 06/15/2023    Procedure: CAUTERIZATION, NOSE, ENDOSCOPIC;  Surgeon: Stef Denise MD;  Location: [facility] OR;  Service: ENT;  Laterality: Bilateral;    ENDOSCOPIC NASAL SEPTOPLASTY N/A 01/14/2023    Procedure: SEPTOPLASTY, NOSE, ENDOSCOPIC;  Surgeon: Stef Denise MD;  Location: [facility] OR;  Service: ENT;  Laterality: N/A;    EYE SURGERY      cataract OU    FLEXIBLE  LARYNGOSCOPY WITH DRUG-INDUCED SEDATION FOR EVALUATION OF SLEEP APNEA N/A 2023    Procedure: LARYNGOSCOPY, FLEXIBLE, WITH DRUG-INDUCED SEDATION, FOR SLEEP APNEA ASSESSMENT;  Surgeon: Stef Denise MD;  Location: [facility] OR;  Service: ENT;  Laterality: N/A;    FUNCTIONAL ENDOSCOPIC SINUS SURGERY (FESS) USING COMPUTER-ASSISTED NAVIGATION Bilateral 2023    Procedure: FESS, USING COMPUTER-ASSISTED NAVIGATION;  Surgeon: Stef Denise MD;  Location: [facility] OR;  Service: ENT;  Laterality: Bilateral;    HEMORRHOID SURGERY  1994    INSERTION, NEUROSTIMULATOR, HYPOGLOSSAL N/A 2023    Procedure: INSERTION,NEUROSTIMULATOR,HYPOGLOSSAL;  Surgeon: Stef Denise MD;  Location: [facility] OR;  Service: ENT;  Laterality: N/A;    LEFT HEART CATHETERIZATION N/A 2021    Procedure: Left heart cath;  Surgeon: Steve Davey III, MD;  Location: [facility] CATH;  Service: Cardiology;  Laterality: N/A;    NASAL TURBINATE REDUCTION N/A 2023    Procedure: REDUCTION, NASAL TURBINATE;  Surgeon: Stef Denise MD;  Location: [facility] OR;  Service: ENT;  Laterality: N/A;    NECK SURGERY      SURGICAL REMOVAL OF PILONIDAL CYST N/A 2021    Procedure: EXCISION, PILONIDAL CYST;  Surgeon: Simon Mojica MD;  Location: [facility] OR;  Service: General;  Laterality: N/A;    urolift       Social History     Tobacco Use    Smoking status: Former     Current packs/day: 0.00     Types: Cigarettes     Quit date: 2018     Years since quittin.8    Smokeless tobacco: Never   Substance Use Topics    Alcohol use: No    Drug use: Not Currently     Family History   Problem Relation Name Age of Onset    Heart disease Mother      Heart attack Mother      Diabetes Mother      Cancer Father      Hypertension Sister      Diabetes Brother      Heart attack Maternal Grandfather      Heart disease Maternal Grandfather      Stroke Maternal Grandfather      Heart attack Paternal Grandmother      Heart disease Paternal Grandmother       "Stroke Paternal Grandfather       Review of patient's allergies indicates:   Allergen Reactions    Tuberculin ppd      Erroneous positive result.           Review of Systems:  a review of eleven systems covering constitutional, Eye, HEENT, Psych, Respiratory, Cardiac, GI, , Musculoskeletal, Endocrine, Dermatologic was negative except for pertinent findings as listed ABOVE and below:  pertinent positives as above, rest good        Exam:Comprehensive exam done. BP (!) 144/74 (BP Location: Right arm, Patient Position: Sitting)   Pulse 75   Ht 5' 10" (1.778 m)   Wt 93.2 kg (205 lb 5.7 oz)   SpO2 96% Comment: on room air at rest  BMI 29.47 kg/m²   Exam included Vitals as listed, and patient's appearance and affect and alertness and mood, oral exam for yeast and hygiene and pharynx lesions and Mallapatti (M) score, neck with inspection for jvd and masses and thyroid abnormalities and lymph nodes (supraclavicular and infraclavicular nodes and axillary also examined and noted if abn), chest exam included symmetry and effort and fremitus and percussion and auscultation, cardiac exam included rhythm and gallops and murmur and rubs and jvd and edema, abdominal exam for mass and hepatosplenomegaly and tenderness and hernias and bowel sounds, Musculoskeletal exam with muscle tone and posture and mobility/gait and  strength, and skin for rashes and cyanosis and pallor and turgor, extremity for clubbing.  Findings were normal except for pertinent findings listed below:  M3, diaphragms not moving wellby percussion but move by ausculatation.  Uses cane.   Voice good          Radiographs (ct chest and cxr) reviewed: view by direct vision  =-- cxr from [facility].    Labs reviewed           PFT will be done and results to be reviewed       Plan:  Clinical impression is apparently straight forward and impression with management as below.    Lore was seen today for shortness of breath.    Diagnoses and all orders for this " visit:    Chronic obstructive pulmonary disease, unspecified COPD type  -     Six Minute Walk Test to qualify for Home Oxygen; Future  -     Complete PFT with bronchodilator; Future  -     SPIROMETRY SUPINE/ERECT; Future  -     Respiratory mechanics; Future    Diaphragm dysfunction  -     Six Minute Walk Test to qualify for Home Oxygen; Future  -     Complete PFT with bronchodilator; Future  -     SPIROMETRY SUPINE/ERECT; Future  -     Respiratory mechanics; Future    COLINDRES (dyspnea on exertion)  -     Six Minute Walk Test to qualify for Home Oxygen; Future  -     Complete PFT with bronchodilator; Future  -     SPIROMETRY SUPINE/ERECT; Future  -     Respiratory mechanics; Future    Wheezes  -     Six Minute Walk Test to qualify for Home Oxygen; Future  -     Complete PFT with bronchodilator; Future  -     SPIROMETRY SUPINE/ERECT; Future  -     Respiratory mechanics; Future        Follow up in about 3 weeks (around 12/4/2024), or if symptoms worsen or fail to improve, for Virtual Visit.    Discussed with patient above for education the following:      Patient Instructions   You have wheezes and prior smoking -- copd or asthma considered but had problem inhaler in past.    You have had very short breath supine-- you have CMT -- breathing involvement occur but not likely.      May need to do ct chest, ddimer for blood clot...        Evaluation took 30 minutes

## 2024-11-14 ENCOUNTER — HOSPITAL ENCOUNTER (OUTPATIENT)
Dept: PULMONOLOGY | Facility: HOSPITAL | Age: 64
Discharge: HOME | End: 2024-11-14
Attending: INTERNAL MEDICINE
Payer: MEDICARE

## 2024-11-14 DIAGNOSIS — J44.9 CHRONIC OBSTRUCTIVE PULMONARY DISEASE, UNSPECIFIED COPD TYPE: ICD-10-CM

## 2024-11-14 DIAGNOSIS — R94.2 ABNORMAL DIFFUSION CAPACITY DETERMINED BY PULMONARY FUNCTION TEST: ICD-10-CM

## 2024-11-14 DIAGNOSIS — R06.2 WHEEZES: ICD-10-CM

## 2024-11-14 DIAGNOSIS — R06.09 DOE (DYSPNEA ON EXERTION): ICD-10-CM

## 2024-11-14 DIAGNOSIS — J98.6 DIAPHRAGM DYSFUNCTION: ICD-10-CM

## 2024-11-14 DIAGNOSIS — R06.09 DYSPNEA ON EXERTION: Primary | ICD-10-CM

## 2024-11-14 LAB
DLCO SINGLE BREATH LLN: 21.04
DLCO SINGLE BREATH PRE REF: 47.8 %
DLCO SINGLE BREATH REF: 27.97
DLCOC SBVA LLN: 2.76
DLCOC SBVA REF: 3.93
DLCOC SINGLE BREATH LLN: 21.04
DLCOC SINGLE BREATH REF: 27.97
DLCOVA LLN: 2.76
DLCOVA PRE REF: 59.9 %
DLCOVA PRE: 2.35 ML/(MIN*MMHG*L) (ref 2.76–5.09)
DLCOVA REF: 3.93
ERV LLN: -16448.89
ERV PRE REF: 95.7 %
ERV REF: 1.11
FEF 25 75 CHG: 28.6 %
FEF 25 75 LLN: 1.6
FEF 25 75 POST REF: 118.5 %
FEF 25 75 PRE REF: 92.1 %
FEF 25 75 REF: 3.31
FET100 CHG: -13.2 %
FEV1 CHG: 6 %
FEV1 FVC CHG: 4.3 %
FEV1 FVC LLN: 64
FEV1 FVC POST REF: 107.7 %
FEV1 FVC PRE REF: 103.3 %
FEV1 FVC REF: 76
FEV1 LLN: 2.45
FEV1 POST REF: 105.3 %
FEV1 PRE REF: 99.4 %
FEV1 REF: 3.34
FRCPLETH LLN: 2.68
FRCPLETH PREREF: 89.1 %
FRCPLETH REF: 3.66
FVC CHG: 1.7 %
FVC LLN: 3.28
FVC POST REF: 97.5 %
FVC PRE REF: 95.9 %
FVC REF: 4.38
IVC PRE: 4.02 L (ref 3.28–5.49)
IVC SINGLE BREATH LLN: 3.28
IVC SINGLE BREATH PRE REF: 91.8 %
IVC SINGLE BREATH REF: 4.38
MEP LLN: 83
MEP PRE REF: 28.4 %
MEP PRE: 28.37 CMH2O (ref 83.23–116.78)
MEP REF: 100
MIP LLN: 58
MIP PRE REF: 31.5 %
MIP PRE: 23.62 CMH2O (ref 58.23–91.78)
MIP REF: 75
MVV LLN: 110
MVV PRE REF: 66.1 %
MVV REF: 130
PEF CHG: 7.5 %
PEF LLN: 6.42
PEF POST REF: 101.9 %
PEF PRE REF: 94.9 %
PEF REF: 8.74
POST FEF 25 75: 3.92 L/S (ref 1.6–5.02)
POST FET 100: 8.54 SEC
POST FEV1 FVC: 82.34 % (ref 63.54–87.83)
POST FEV1: 3.51 L (ref 2.45–4.17)
POST FVC: 4.27 L (ref 3.28–5.49)
POST PEF: 8.9 L/S (ref 6.42–11.05)
PRE DLCO: 13.36 ML/(MIN*MMHG) (ref 21.04–34.9)
PRE ERV: 1.06 L (ref -16448.89–16451.11)
PRE FEF 25 75: 3.05 L/S (ref 1.6–5.02)
PRE FET 100: 9.85 SEC
PRE FEV1 FVC: 78.98 % (ref 63.54–87.83)
PRE FEV1: 3.32 L (ref 2.45–4.17)
PRE FRC PL: 3.27 L (ref 2.68–4.65)
PRE FVC: 4.2 L (ref 3.28–5.49)
PRE MVV: 85.73 L/MIN (ref 110.21–149.1)
PRE PEF: 8.29 L/S (ref 6.42–11.05)
PRE RV: 2.2 L (ref 1.88–3.23)
PRE TLC: 6.4 L (ref 5.97–8.28)
RAW LLN: 3.06
RAW PRE REF: 82.6 %
RAW PRE: 2.53 CMH2O*S/L (ref 3.06–3.06)
RAW REF: 3.06
RV LLN: 1.88
RV PRE REF: 86.3 %
RV REF: 2.55
RVTLC LLN: 31
RVTLC PRE REF: 86.6 %
RVTLC PRE: 34.39 % (ref 30.72–48.68)
RVTLC REF: 40
TLC LLN: 5.97
TLC PRE REF: 89.8 %
TLC REF: 7.13
VA PRE: 5.68 L (ref 6.98–6.98)
VA SINGLE BREATH LLN: 6.98
VA SINGLE BREATH PRE REF: 81.4 %
VA SINGLE BREATH REF: 6.98
VC LLN: 3.28
VC PRE REF: 95.9 %
VC PRE: 4.2 L (ref 3.28–5.49)
VC REF: 4.38

## 2024-11-14 PROCEDURE — 94010 BREATHING CAPACITY TEST: CPT

## 2024-11-14 PROCEDURE — 99499 UNLISTED E&M SERVICE: CPT | Mod: ,,, | Performed by: INTERNAL MEDICINE

## 2024-11-14 PROCEDURE — 94799 UNLISTED PULMONARY SVC/PX: CPT | Mod: 26,,, | Performed by: INTERNAL MEDICINE

## 2024-11-14 PROCEDURE — 94618 PULMONARY STRESS TESTING: CPT

## 2024-11-14 PROCEDURE — 94200 LUNG FUNCTION TEST (MBC/MVV): CPT

## 2024-11-14 PROCEDURE — 94060 EVALUATION OF WHEEZING: CPT | Mod: 26,59,, | Performed by: INTERNAL MEDICINE

## 2024-11-14 PROCEDURE — 94729 DIFFUSING CAPACITY: CPT | Mod: 26,,, | Performed by: INTERNAL MEDICINE

## 2024-11-14 PROCEDURE — 94618 PULMONARY STRESS TESTING: CPT | Mod: 26,,, | Performed by: INTERNAL MEDICINE

## 2024-11-14 PROCEDURE — 94729 DIFFUSING CAPACITY: CPT

## 2024-11-14 PROCEDURE — 94726 PLETHYSMOGRAPHY LUNG VOLUMES: CPT | Mod: 26,,, | Performed by: INTERNAL MEDICINE

## 2024-11-14 PROCEDURE — 94726 PLETHYSMOGRAPHY LUNG VOLUMES: CPT

## 2024-11-14 PROCEDURE — 94060 EVALUATION OF WHEEZING: CPT

## 2024-11-14 RX ORDER — ALBUTEROL SULFATE 2.5 MG/.5ML
SOLUTION RESPIRATORY (INHALATION)
Status: DISCONTINUED
Start: 2024-11-14 | End: 2024-11-15 | Stop reason: HOSPADM

## 2024-11-14 NOTE — PROGRESS NOTES
Notify the breathing test was very good, supine and erect vital capacity was very good, but there was 1 part of the breathing test that suggested some degree of muscle weakness.  The muscle weakness was not all that bad.    Would recommend checking D-dimer to make sure there is no blood clot.  Muscle weakness is probably not severe enough to cause shortness of breath (? ).  The patient does have increased work of breathing to walk.    Would do D-dimer and consider a CT of the chest to make sure no other abnormalities are seen.  The diffusion was fairly low.

## 2024-11-29 ENCOUNTER — TELEPHONE (OUTPATIENT)
Dept: PULMONOLOGY | Facility: CLINIC | Age: 64
End: 2024-11-29
Payer: MEDICARE

## 2024-11-29 NOTE — TELEPHONE ENCOUNTER
----- Message from Sherron sent at 11/29/2024 11:18 AM CST -----  Contact: pt  Type: Needs Medical Advice         Who Called: Pt  Best Call Back Number:[phone]  Additional Information: Requesting a call back regarding  pt returned office call from Jenifer and asking for her to call him back please.   Please Advise- Thank you

## 2024-11-29 NOTE — TELEPHONE ENCOUNTER
Spoke to patient regarding results.  He is going to [facility] to do ddimer lab today.

## 2024-12-03 ENCOUNTER — OFFICE VISIT (OUTPATIENT)
Dept: GASTROENTEROLOGY | Facility: CLINIC | Age: 64
End: 2024-12-03
Payer: MEDICARE

## 2024-12-03 VITALS — HEIGHT: 72 IN | WEIGHT: 205.69 LBS | BODY MASS INDEX: 27.86 KG/M2

## 2024-12-03 DIAGNOSIS — R14.0 BLOATING: Primary | ICD-10-CM

## 2024-12-03 DIAGNOSIS — K21.9 GASTROESOPHAGEAL REFLUX DISEASE, UNSPECIFIED WHETHER ESOPHAGITIS PRESENT: ICD-10-CM

## 2024-12-03 DIAGNOSIS — E66.3 OVERWEIGHT WITH BODY MASS INDEX (BMI) OF 27 TO 27.9 IN ADULT: ICD-10-CM

## 2024-12-03 DIAGNOSIS — D50.9 IRON DEFICIENCY ANEMIA, UNSPECIFIED IRON DEFICIENCY ANEMIA TYPE: ICD-10-CM

## 2024-12-03 DIAGNOSIS — K44.9 HIATAL HERNIA: ICD-10-CM

## 2024-12-03 DIAGNOSIS — R10.13 DYSPEPSIA: ICD-10-CM

## 2024-12-03 DIAGNOSIS — R19.4 CHANGE IN BOWEL HABITS: ICD-10-CM

## 2024-12-03 PROCEDURE — 99214 OFFICE O/P EST MOD 30 MIN: CPT | Mod: PBBFAC,PO | Performed by: INTERNAL MEDICINE

## 2024-12-03 PROCEDURE — 99999 PR PBB SHADOW E&M-EST. PATIENT-LVL IV: CPT | Mod: PBBFAC,,, | Performed by: INTERNAL MEDICINE

## 2024-12-03 PROCEDURE — 99204 OFFICE O/P NEW MOD 45 MIN: CPT | Mod: S$PBB,,, | Performed by: INTERNAL MEDICINE

## 2024-12-03 RX ORDER — LUBIPROSTONE 8 UG/1
8 CAPSULE ORAL 2 TIMES DAILY WITH MEALS
Qty: 60 CAPSULE | Refills: 11 | Status: SHIPPED | OUTPATIENT
Start: 2024-12-03

## 2024-12-03 NOTE — PROGRESS NOTES
"Subjective     Patient ID: Lore Viramontes is a 64 y.o. male.    Chief Complaint: Abdominal Pain (nausea) and Constipation    This is my 1st encounter with this pleasant 64-year-old male, who presents to establish in our clinic.  He reports he has" a bunch of different issues" with his GI system.  He was previously followed by Dr. Yi ( see notes below, including recent EGD.)    He reports years of alternating bowel movements, going from constipation to loose stools.  But now, he feels like he has to use the bathroom all the time.  He expects it to be loose, especially if he takes a laxative.  But last week, he was constipated, and even had to "dig it out. "  He also reports that for the last 6-8 months, when he eats, he "blows up. "  And sometimes it gets so bad he "can't breathe. "  (he is now seeing Dr. Garcia, pulmonologist.)    Recently, he was seeing Dr. Yi.  See his note from November (below).  And he had had an EGD in August, because of the complaints of indigestion, occasional heartburn, and especially the bloating.   He has even had his wife change her cooking, avoiding spicy foods.  He has been taking Protonix, even b.i.d..  Also Phazyme.  And MiraLax.    In the past he had taken stool softeners, as much as 3 b.i.d..  His last colonoscopy was in October of 2021, with Dr. Anton.  He was tried on Linzess, but it may have worked "too good? ".  He remembers being on the 145 mcg dose and the 72 mcg dose.  (he mentions that he has a history of hemorrhoid surgery, back around 1994).    He also recently started Ozempic, about 2 months ago.  But he reports that his symptoms did not worsen after starting the Ozempic.    He also has a history of Charcot-Madelaine-Tooth disease.    Recently he had minor surgery  (cyst removal) and he was noted to have rather significant anemia.  This is being evaluated as well.  He can not recall the blood specialist he is seeing.  11/26/24 07:10  WBC: 10.07  Hemoglobin: 7.9 " (L)  Hematocrit: 25.7 (L)  MCV: 87  Platelet Count: 554 (H)            Efren Yi MD  Physician  Specialty: Gastroenterology   Progress Notes     Signed     Encounter Date: 11/4/2024  Creation Time: 11/4/2024  1:15 PM  Subjective  Patient ID: Lore Viramontes is a 64 y.o. male.   HPI Patient came for follow up, complaints of indigestion, and bloating and constipation, patient is on Ozempic, denies dysphagia, heart burn. Takes Protonix and Pepcid. Phazyme and Miralax. He is losing Natali about 12 lbs since on Ozempic.     Had EGD.   DATE OF SURGERY: 8/20/2024   PREOPERATIVE DIAGNOSIS:   1. Bloating.  2. Epigastric pain.  3. Nausea.  4. Choking.  5. Indigestion.  6. Chronic anemia   POSTOPERATIVE DIAGNOSIS:    1. Distal esophagitis.  2. Schatzki's ring in the distal esophagus.  3. Acute gastritis.  4. Hiatus hernia.  5. Duodenitis.   PROCEDURE PERFORMED:Endoscopic Gastroduodenoscopy with biopsy, esophageal dilation  FINAL PATHOLOGIC DIAGNOSIS    1. Small intestine, duodenum, biopsy:                                      - Duodenal mucosa with a single, small, nonspecific granuloma           (see Comment).                                                          - Negative for increased intraepithelial lymphocytes.                     Comment #1: The differential diagnosis includes but is not              necessarily limited to idiopathic granuloma, infection, foreign         body reaction, or a systemic inflammatory / autoimmune                  condition. Importantly, no features to suggest inflammatory             bowel disease are present. Clinical correlation is recommended.        2. Stomach, biopsy:                                                        - Mild chronic inactive gastritis.                                      - Immunostains for Helicobacter pylori is negative.                     - Negative for intestinal metaplasia or dysplasia.                     3. Esophagus, biopsy:                                                       - Squamous mucosa with features suggestive of reflux.                   - Gastric cardia with mild chronic gastric carditis.                    - Negative for intestinal metaplasia or dysplasia.                                                     Simón Causey M.D.                                                   [facility] Pathologist, Electronic Signature       Wt Readings from Last 15 Encounters:  01/08/25 : 90.7 kg (200 lb)  12/06/24 : 93.7 kg (206 lb 9.1 oz)  12/04/24 : 93.7 kg (206 lb 9.1 oz)  12/03/24 : 93.3 kg (205 lb 11 oz)  11/22/24 : 93.9 kg (207 lb)  11/25/24 : 94.6 kg (208 lb 8.9 oz)  11/13/24 : 93.2 kg (205 lb 5.7 oz)  11/12/24 : 96.4 kg (212 lb 8.4 oz)  01/05/24 : 96.4 kg (212 lb 8 oz)  07/06/23 : 90.3 kg (199 lb)  06/16/23 : 95.4 kg (210 lb 5.1 oz)  06/14/23 : 88.5 kg (195 lb)  01/14/23 : 100.2 kg (221 lb)  01/06/23 : 100.3 kg (221 lb 3.2 oz)  07/27/22 : 96.6 kg (213 lb)        Review of Systems   Constitutional:  Negative for activity change, appetite change, fatigue, fever and unexpected weight change.   HENT: Negative.  Negative for dental problem, drooling, mouth sores, sore throat, trouble swallowing and voice change.    Eyes: Negative.    Respiratory:  Negative for cough, choking, shortness of breath, wheezing and stridor.    Cardiovascular:  Negative for chest pain.   Gastrointestinal:  Positive for abdominal distention (Bloating.), abdominal pain (Gas pains.), constipation and diarrhea. Negative for anal bleeding, blood in stool, nausea, rectal pain and vomiting.   Genitourinary: Negative.    Musculoskeletal:  Negative for arthralgias, joint swelling, myalgias and neck stiffness.   Integumentary:  Negative for color change and rash.   Neurological:  Negative for weakness.   Hematological:  Negative for adenopathy. Does not bruise/bleed easily.   Psychiatric/Behavioral:  Negative for agitation, behavioral problems, confusion, decreased concentration and dysphoric  mood.         Objective     Physical Exam  Constitutional:       General: He is not in acute distress.     Appearance: Normal appearance. He is well-developed. He is obese.   HENT:      Head: Normocephalic.      Nose: Nose normal.      Mouth/Throat:      Mouth: Mucous membranes are moist.      Pharynx: No oropharyngeal exudate.   Eyes:      General: No scleral icterus.     Conjunctiva/sclera: Conjunctivae normal.      Pupils: Pupils are equal, round, and reactive to light.   Neck:      Thyroid: No thyromegaly.      Trachea: No tracheal deviation.   Cardiovascular:      Rate and Rhythm: Normal rate and regular rhythm.      Heart sounds: Normal heart sounds. No murmur heard.     No gallop.   Pulmonary:      Effort: Pulmonary effort is normal.      Breath sounds: Normal breath sounds. No wheezing or rales.   Abdominal:      General: Bowel sounds are normal. There is no distension.      Palpations: Abdomen is soft. There is no mass.      Tenderness: There is abdominal tenderness (Minimally tender left lower quadrant.). There is no guarding.   Genitourinary:     Rectum: Guaiac result negative.      Comments: Rectal exam normal.  Normal tone. No masses. Prostate normal.  Musculoskeletal:         General: Normal range of motion.      Cervical back: Neck supple.   Lymphadenopathy:      Cervical: No cervical adenopathy.   Skin:     General: Skin is warm and dry.      Findings: No erythema or rash.   Neurological:      General: No focal deficit present.      Mental Status: He is alert and oriented to person, place, and time.   Psychiatric:         Mood and Affect: Mood normal.         Behavior: Behavior normal.        Assessment and Plan     Bloating  -     NM Gastric Emptying; Future; Expected date: 12/03/2024    Dyspepsia    Gastroesophageal reflux disease, unspecified whether esophagitis present    Hiatal hernia    Change in bowel habits    Iron deficiency anemia, unspecified iron deficiency anemia type    Overweight with  body mass index (BMI) of 27 to 27.9 in adult    Other orders  -     lubiprostone (AMITIZA) 8 MCG Cap; Take 1 capsule (8 mcg total) by mouth 2 (two) times daily with meals.  Dispense: 60 capsule; Refill: 11      Continue current meds the same.  Trial of Amitiza, starting at 8 mcg b.i.d..  Question of Ozempic causing gastric emptying disturbance.  Would like to get a gastric emptying study.  Call for reports.       ADDENDUM:  Patient had a normal gastric emptying study.  FINDINGS:  At 4 hour(s) the percentage of retention is 4 % (normal retention at 4 hours is 10% and lower).   Impression:   Normal gastric emptying time.   Electronically signed by:Erik Man  Date:                                            01/02/2025      ADDENDUM:  Patient has also had a bone marrow study, which is abnormal     01/20/2025 JARRED/rush     BONE MARROW, RIGHT ILIAC CREST, ASPIRATE, CLOT SECTION, AND CORE    BIOPSY:--MYELOID NEOPLASM CHARACTERIZED BY HYPERCELLULAR MARROW      (APPROXIMATELY 80% TO 85%) WITH TRILINEAGE HEMATOPOIETIC ELEMENTS,      ERYTHROID EXPANSION WITH DYSERYTHROPOIESIS AND MEGALOBLASTOID      CHANGES, MEGAKARYOCYTIC EXPANSION WITH MEGAKARYOCYTIC ATYPIA      AND CLUSTERING, AND INCREASED RING SIDEROBLASTS (GREATER THAN 15%)      [SEE COMMENT].     --PERIPHERAL BLOOD WITH THROMBOCYTOSIS (532,000/MICROLITER); ANEMIA      (HEMOGLOBIN 7.7 GRAM/DECILITER), WITH FEW NUCLEATED ERYTHROCYTES;      AND NORMAL LEUKOCYTE COUNT (9,590/MICROLITER), WITH MOSTLY UNREMARKABLE      DIFFERENTIAL COUNT AND MORPHOLOGY.     Comment:     1. The overall findings by morphology are indicative of a myeloid neoplasm. Given the overall constellation of features    (hypercellularity, erythroid expansion with dyserythropoiesis,  megakaryocytic expansion and atypia, increased ring sideroblasts, along    with peripheral blood demonstrating anemia and thrombocytosis),  myelodysplastic/myelooproliferative neoplasm with ring sideroblasts and     "thrombocytosis (MDS/MPN-RS-T) is the favored entity, regarding subclassification. (It should be noted that the current 2024 World    Health Organization [WHO] Classification uses the terminology "myelodysplastic/myeloproliferative neoplasm with SF3B1 mutation and    thrombocytosis [MDS/MPN-GN0W6-Q]," with demonstration of SF3B1 mutation required.) Other differential diagnostic considerations, regarding    subclassification of this myeloid neoplasm, would include myeloproliferative neoplasm, not otherwise specified, with ring    sideroblasts; or mixed myelodysplastic/myeloproliferative neoplasm, not otherwise specified, with ring sideroblasts. Considering the overall    findings (including increased ring sideroblasts), a pure essential thrombocythemia (ET) is unlikely. There is no increase of blasts (less    than 2%) or significant reticulin fibrosis (MF-0). Overall, correlation with clinicoradiologic findings (including evaluation for organomegaly,    etc), hematologic parameters (including hematologic historic data, therapy history, etc), and laboratory studies (including BCR/ABL    testing, JAK2 mutation study, calreticulin, MPL, etc.) would be needed for further potential characterization. Correlation with pending    ancillary studies additionally is recommended (see below Comment #5).     2. Please see separate report for bone marrow flow cytometric evaluation    ([id number]; 01/08/2025).     3. By report from outside facility, cytogenetic studies revealed    46,XY[20] normal male chromosomal complement. For descriptive    analysis/details, please see that separate Chromosome Analysis Report    ([id number]; 01/13/2025) from [facility] [place], [place].     4. Also by report from outside facility, fluorescence in-situ    hybridization [FISH] studies (MDS Panel) was reported as follows: "This    is a normal study. No abnormal single patterns were found for the    probed chromosome " "regions of the MDS panel (5/5q, 7/7q, 8, 20q, MLL)."    For additional details, please see that separate Summary of MDS FISH    Reports ([id number]; 01/13/2025) from [facility] [place], [place].     5. Next Generation Sequencing [NGS] studies (Myeloid Disorders Profile)    are pending at extradepartmental facility. Results will be reported in    subsequent addendum.     Addendum Report Verified By Paul Ramirez MD, FCAP on 01/20/2025    11:07 AM       "

## 2024-12-04 ENCOUNTER — OFFICE VISIT (OUTPATIENT)
Dept: PULMONOLOGY | Facility: CLINIC | Age: 64
End: 2024-12-04
Payer: MEDICARE

## 2024-12-04 VITALS
DIASTOLIC BLOOD PRESSURE: 78 MMHG | OXYGEN SATURATION: 96 % | HEIGHT: 72 IN | WEIGHT: 206.56 LBS | BODY MASS INDEX: 27.98 KG/M2 | HEART RATE: 82 BPM | SYSTOLIC BLOOD PRESSURE: 154 MMHG

## 2024-12-04 DIAGNOSIS — J99 NEUROMUSCULAR LUNG DISEASE: ICD-10-CM

## 2024-12-04 DIAGNOSIS — R91.1 LUNG NODULE: ICD-10-CM

## 2024-12-04 DIAGNOSIS — R06.00 DYSPNEA, UNSPECIFIED TYPE: Primary | ICD-10-CM

## 2024-12-04 DIAGNOSIS — G70.89 NEUROMUSCULAR LUNG DISEASE: ICD-10-CM

## 2024-12-04 DIAGNOSIS — J98.6 DIAPHRAGM DYSFUNCTION: ICD-10-CM

## 2024-12-04 PROBLEM — R06.2 WHEEZES: Status: RESOLVED | Noted: 2024-11-13 | Resolved: 2024-12-04

## 2024-12-04 PROBLEM — J44.9 CHRONIC OBSTRUCTIVE PULMONARY DISEASE: Status: RESOLVED | Noted: 2024-11-13 | Resolved: 2024-12-04

## 2024-12-04 PROCEDURE — 99999 PR PBB SHADOW E&M-EST. PATIENT-LVL III: CPT | Mod: PBBFAC,,, | Performed by: INTERNAL MEDICINE

## 2024-12-04 PROCEDURE — 99213 OFFICE O/P EST LOW 20 MIN: CPT | Mod: PBBFAC,PO | Performed by: INTERNAL MEDICINE

## 2024-12-04 PROCEDURE — 99215 OFFICE O/P EST HI 40 MIN: CPT | Mod: S$PBB,,, | Performed by: INTERNAL MEDICINE

## 2024-12-04 NOTE — PROGRESS NOTES
12/4/2024    Lore Viramontes  New Patient Consult    Chief Complaint   Patient presents with    Follow-up    COPD       HPI:     12/4/2024   Pt had supine erect vital capacity with fvc falling to 3.5-- fvc was 3.7 on supine erect capacity, using 4.2 vital capacity (from standard breathing test) would be 20% fall, otherwise not significant fall.    Hrct (not dynamic) was good.  There was an area of the trachea did not looks to be redundant and could be some tracheobronchomalacia (? ).  CT scan of the chest is viewed.  5 mm right lung nodule seen.  It was not in report.    Pt relates cannot exhale sense causing sob?  Pt will use niv/bipap but dislikes...     Spirometry bronchodilator, lung volume by body box, diffusion capacity, and respiratory mechanics were performed  November 14, 2024. Spirometry was normal with no measurable airflow obstruction. The FEV1 improved by 6% following  bronchodilator with 12% needed for statistical significance. There was no measurable COPD. Lung volumes by body box  were normal. Diffusion was slightly low at 48% of predicted uncorrected for anemia.  Maximal voluntary ventilation was low at 66% reflecting neuromuscular disease or poor effort.  Maximum inspiratory pressure was 32% of predicted and relatively low. Maximum expiratory pressure was also low at  28% of predicted.    11/13/22024 pt  ,. . Pt has osas with inspire placed last yr.  Pt had Charcot Madelaine Tooth --several Notify ct chest shows good looking lungs. Charcot Madelaine Tooth might be playing a role but not typical.  Muscles not bad on breathing test (very minimal weakness if any).  Could re test in 1-3 yrs if needed.  Would recommend f/u prn or 1-2 yrs.... notifyNotify the breathing test was very good, supine and erect vital capacity was very good, but there was 1 part of the breathing test that suggested some degree of muscle weakness.  family members with illness and no resp issues in family.  No cough but  wheezes noted...    Pt used inhaler once     -- ppt could not breath fro few minutes .   Patient had been a pack-a-day smoker for many years -quit smoking in 2018.  Pt relates sob  worsens supine - ongoing for years and worsening    The patient will have episodes of shortness of breath and apply his old BiPAP device.  BiPAP does help his dyspnea.  He is still fairly active despite having CMT.    Nuclear stress test last yr had good ef...  echo was good in 2021       Pt denies episode sob, min chr pain but no swelling...  Patient Instructions   You have wheezes and prior smoking -- copd or asthma considered but had problem inhaler in past.    You have had very short breath supine-- you have CMT -- breathing involvement occur but not likely.      May need to do ct chest, ddimer for blood clot...    PFSH:  Past Medical History:   Diagnosis Date    Anticoagulant long-term use     Arthritis     Back pain     Blurred vision     Cancer     skin cancer to top of head    Cervical disc disorder     degeneration and herniation and stenosis    Charcot-Madelaine-Tooth atrophy     LEGS AND ARMS    Coronary artery disease     Gallbladder disease     Hearing loss of both ears     Hypertension     Neck pain on right side     radiation down right arm    Prostate enlargement     Ringing in ears     Sleep apnea     does not use CPAP         Past Surgical History:   Procedure Laterality Date    ABSCESS DRAINAGE Left 11/26/2024    Procedure: DRAINAGE, ABSCESS;  Surgeon: JEROME Cole MD;  Location: [facility] OR;  Service: General;  Laterality: Left;    BACK SURGERY      CATARACT EXTRACTION  2016    CHOLECYSTECTOMY  2010    CYST REMOVAL  2018    rectal    CYST REMOVAL Left 11/26/2024    Procedure: EXCISION, CYST, NECK;  Surgeon: JEROME Cole MD;  Location: [facility] OR;  Service: General;  Laterality: Left;    ENDOSCOPIC NASAL CAUTERIZATION Bilateral 06/15/2023    Procedure: CAUTERIZATION, NOSE, ENDOSCOPIC;  Surgeon: Stef Denise MD;   Location: [facility] OR;  Service: ENT;  Laterality: Bilateral;    ENDOSCOPIC NASAL SEPTOPLASTY N/A 2023    Procedure: SEPTOPLASTY, NOSE, ENDOSCOPIC;  Surgeon: Stef Denise MD;  Location: [facility] OR;  Service: ENT;  Laterality: N/A;    EYE SURGERY      cataract OU    FLEXIBLE LARYNGOSCOPY WITH DRUG-INDUCED SEDATION FOR EVALUATION OF SLEEP APNEA N/A 2023    Procedure: LARYNGOSCOPY, FLEXIBLE, WITH DRUG-INDUCED SEDATION, FOR SLEEP APNEA ASSESSMENT;  Surgeon: Stef Denise MD;  Location: [facility];  Service: ENT;  Laterality: N/A;    FUNCTIONAL ENDOSCOPIC SINUS SURGERY (FESS) USING COMPUTER-ASSISTED NAVIGATION Bilateral 2023    Procedure: FESS, USING COMPUTER-ASSISTED NAVIGATION;  Surgeon: Stef Denise MD;  Location: [facility] OR;  Service: ENT;  Laterality: Bilateral;    HEMORRHOID SURGERY  1994    INSERTION, NEUROSTIMULATOR, HYPOGLOSSAL N/A 2023    Procedure: INSERTION,NEUROSTIMULATOR,HYPOGLOSSAL;  Surgeon: Stef Denise MD;  Location: [facility];  Service: ENT;  Laterality: N/A;    Inspire implant for sleep apnea N/A 2023    LEFT HEART CATHETERIZATION N/A 2021    Procedure: Left heart cath;  Surgeon: Steve Davey III, MD;  [facility];  Service: Cardiology;  Laterality: N/A;    NASAL TURBINATE REDUCTION N/A 2023    Procedure: REDUCTION, NASAL TURBINATE;  Surgeon: Stef Denise MD;  Location: [facility];  Service: ENT;  Laterality: N/A;    NECK SURGERY      right elbow bursa removal      SURGICAL REMOVAL OF PILONIDAL CYST N/A 2021    Procedure: EXCISION, PILONIDAL CYST;  Surgeon: Simon Mojica MD;  Location: [facility] OR;  Service: General;  Laterality: N/A;    urolift       Social History     Tobacco Use    Smoking status: Former     Current packs/day: 0.00     Types: Cigarettes     Quit date: 2018     Years since quittin.9     Passive exposure: Never    Smokeless tobacco: Never   Substance Use Topics    Alcohol use: No    Drug use: Not Currently      Family History   Problem Relation Name Age of Onset    Heart disease Mother      Heart attack Mother      Diabetes Mother      Cancer Father      Hypertension Sister      Diabetes Brother      Heart attack Maternal Grandfather      Heart disease Maternal Grandfather      Stroke Maternal Grandfather      Heart attack Paternal Grandmother      Heart disease Paternal Grandmother      Stroke Paternal Grandfather       Review of patient's allergies indicates:   Allergen Reactions    Tuberculin ppd      Erroneous positive result.           Review of Systems:  a review of eleven systems covering constitutional, Eye, HEENT, Psych, Respiratory, Cardiac, GI, , Musculoskeletal, Endocrine, Dermatologic was negative except for pertinent findings as listed ABOVE and below:  pertinent positives as above, rest good        Exam:Comprehensive exam done. BP (!) 154/78 (BP Location: Right arm, Patient Position: Sitting)   Pulse 82   Ht 6' (1.829 m)   Wt 93.7 kg (206 lb 9.1 oz)   SpO2 96% Comment: on room air at rest  BMI 28.02 kg/m²   Exam included Vitals as listed, and patient's appearance and affect and alertness and mood, oral exam for yeast and hygiene and pharynx lesions and Mallapatti (M) score, neck with inspection for jvd and masses and thyroid abnormalities and lymph nodes (supraclavicular and infraclavicular nodes and axillary also examined and noted if abn), chest exam included symmetry and effort and fremitus and percussion and auscultation, cardiac exam included rhythm and gallops and murmur and rubs and jvd and edema, abdominal exam for mass and hepatosplenomegaly and tenderness and hernias and bowel sounds, Musculoskeletal exam with muscle tone and posture and mobility/gait and  strength, and skin for rashes and cyanosis and pallor and turgor, extremity for clubbing.  Findings were normal except for pertinent findings listed below:  M3, diaphragms not moving wellby percussion but move by ausculatation.   Uses cane.   Voice good          Radiographs (ct chest and cxr) reviewed: view by direct vision  =-- cx[facility] from [facility].    Labs reviewed           PFT will be done and results to be reviewed       Plan:  Clinical impression is apparently straight forward and impression with management as below.    Lore was seen today for follow-up and copd.    Diagnoses and all orders for this visit:    Dyspnea, unspecified type  -     CT Chest High Resolution Without Contrast; Future    Lung nodule  -     CT Chest High Resolution Without Contrast; Future    Diaphragm dysfunction    Neuromuscular lung disease          Follow up in about 6 months (around 6/4/2025), or if symptoms worsen or fail to improve.    Discussed with patient above for education the following:      Patient Instructions   Breathing test suggest some muscle weakness so CMT may be playing a role.    You have 5 mm right lung nodule not mentioned on ct report- you stopped smoking 8 yrs ago, risk cancer <1 out 100 but should have follow  up.    Ct chest with good looking lung tissue-- you may have focal tracheobronchomalacia in one region but not suggested by breathing test?    Could do follow up ct 6-12 months with dynamic imaging.   Could do sooner if requested.    Stay upright for CMT concerns breathing, use bipap as needed... not life threatening now               Evaluation took 49 minutes

## 2024-12-04 NOTE — PATIENT INSTRUCTIONS
Breathing test suggest some muscle weakness so CMT may be playing a role.    You have 5 mm right lung nodule not mentioned on ct report- you stopped smoking 8 yrs ago, risk cancer <1 out 100 but should have follow  up.    Ct chest with good looking lung tissue-- you may have focal tracheobronchomalacia in one region but not suggested by breathing test?    Could do follow up ct 6-12 months with dynamic imaging.   Could do sooner if requested.    Stay upright for CMT concerns breathing, use bipap as needed... not life threatening now

## 2024-12-05 ENCOUNTER — TELEPHONE (OUTPATIENT)
Dept: PULMONOLOGY | Facility: CLINIC | Age: 64
End: 2024-12-05
Payer: MEDICARE

## 2024-12-05 NOTE — TELEPHONE ENCOUNTER
Spoke to patient and spouse on phone and told them about upcoming appointments for June 2025.  They verbalized understanding

## 2024-12-05 NOTE — TELEPHONE ENCOUNTER
----- Message from Nurse Brown sent at 12/5/2024  9:51 AM CST -----  Patient returning your call.  ----- Message -----  From: Stephanie Joiner  Sent: 12/4/2024   4:45 PM CST  To: Jose FOREMAN Staff    Type:  Patient Returning Call    Who Called:  pt wife  Who Left Message for Patient:  petty  Does the patient know what this is regarding?:    Best Call Back Number:  [phone]  Additional Information:  pt is returning your call

## 2025-01-02 ENCOUNTER — HOSPITAL ENCOUNTER (OUTPATIENT)
Dept: RADIOLOGY | Facility: HOSPITAL | Age: 65
Discharge: HOME | End: 2025-01-02
Attending: INTERNAL MEDICINE
Payer: MEDICARE

## 2025-01-02 DIAGNOSIS — R14.0 BLOATING: ICD-10-CM

## 2025-01-02 PROCEDURE — 78264 GASTRIC EMPTYING IMG STUDY: CPT | Mod: 26,,, | Performed by: STUDENT IN AN ORGANIZED HEALTH CARE EDUCATION/TRAINING PROGRAM

## 2025-01-02 PROCEDURE — A9541 TC99M SULFUR COLLOID: HCPCS | Mod: PO | Performed by: INTERNAL MEDICINE

## 2025-01-02 PROCEDURE — 78264 GASTRIC EMPTYING IMG STUDY: CPT | Mod: TC,PO

## 2025-01-02 RX ORDER — TECHNETIUM TC 99M SULFUR COLLOID 2 MG
1 KIT MISCELLANEOUS
Status: COMPLETED | OUTPATIENT
Start: 2025-01-02 | End: 2025-01-02

## 2025-01-02 RX ADMIN — TECHNETIUM TC 99M SULFUR COLLOID 1 MILLICURIE: KIT at 08:01

## 2025-01-13 ENCOUNTER — TELEPHONE (OUTPATIENT)
Dept: GASTROENTEROLOGY | Facility: CLINIC | Age: 65
End: 2025-01-13
Payer: MEDICARE

## 2025-01-13 NOTE — TELEPHONE ENCOUNTER
Returned call to the patient's wife,  she was notified that message was sent to Dr. Jones and once a response was received back from Dr. Jones then we would get the response to them, wife verbalized understanding of this.

## 2025-01-13 NOTE — TELEPHONE ENCOUNTER
----- Message from Ana Laura sent at 1/13/2025 11:00 AM CST -----  Contact: pt  Type:  Test Results    Who Called: pt    Name of Test (Lab/Mammo/Etc):  gastric emptying     Date of Test:  01/02    Ordering Provider:  Robert     Where the test was performed[facility]     Would the patient rather a call back or a response via [facility]?  Call back    Best Call Back Number: [phone]    Additional Information:   never got results and never contacted about follow up visit    Please call to advise    Thanks

## 2025-01-13 NOTE — TELEPHONE ENCOUNTER
----- Message from Chante sent at 1/13/2025  2:52 PM CST -----  Type:  Needs Medical Advice    Who Called: Patient Ashlie-Wife    Would the patient rather a call back or a response via [facility]? call    Best Call Back Number: [phone]    Additional Information: The patients wife stated they are having phone issues, please call them back regarding then test results  Please call back to advise. Thanks!

## 2025-01-13 NOTE — TELEPHONE ENCOUNTER
Attempted to reach the patient, left message asking for a call back, clinic number provided.  Patient requesting his results, message sent to Dr. Jones for review.

## 2025-01-22 ENCOUNTER — PATIENT MESSAGE (OUTPATIENT)
Dept: GASTROENTEROLOGY | Facility: CLINIC | Age: 65
End: 2025-01-22
Payer: MEDICARE

## 2025-04-29 ENCOUNTER — TELEPHONE (OUTPATIENT)
Dept: HEMATOLOGY/ONCOLOGY | Facility: CLINIC | Age: 65
End: 2025-04-29
Payer: COMMERCIAL

## 2025-04-29 DIAGNOSIS — D46.9 MDS (MYELODYSPLASTIC SYNDROME): Primary | ICD-10-CM

## 2025-04-29 NOTE — NURSING
Mr. Viramontes arrived in [facility] requesting a 2nd opinion with a physician specializing in malignant hematology.     I introduced myself and explained my role as an oncology navigator. We discussed Mr. Viramontes' medical history.     He is established with Dr. Cirilo Sullivan at [facility]. 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 Pathology report in Epic. He reports a longstanding history of anemia prior to this diagnosis    He states he receives Luspatercept Q3W - next dose due Monday 5/5. He states his H/H waxes and wanes. He notes some improvement in the days following his injections but continues to c/o fatigue/weakness/insomnia. Of note, he has a hx Charcot-Madelaine-Tooth disease.     He is concerned that his disease may be progressing and desires to seek a 2nd opinion.     I offered the first available appointment with Dr. King Garrison at [facility] on Tuesday 5/6. Explained that this will be a virtual visit. Mr. Viramontes and his wife state that they are uncomfortable with a virtual visit and would rather an in-person visit. Explained that in-person visits are scheduled at [facility]. Mr. Viramontes states he is willing to drive to [place] to meet with Dr. Garrison. He explains that he is hard of hearing.     He is willing to follow-up with Dr. Garrison / ASHKAN Wayne NP locally. May be interested in virtual follow-up visits in the future. He may desire to transfer treatment to [facility] after his new patient visit.     NAYAN signed. Permission rec'd from Mr. Viramontes to request medical records from Dr. Sullivan. Explained that Elsa, Dr. Garrison's MA, will contact him to schedule the next available in-person visit.     My contact information provided. Encouraged Mr. Viramontes to contact me with any questions/concerns moving forward. He v/u and thanked me for my time.    Faxed NAYAN and medical records request to [facility].      Added self to care team. Blane tool updated. Will follow for [facility] oncology navigation needs.     Oncology Navigation   Intake  Date of Diagnosis: 01/08/25  Cancer Type: Other Malignant Hem (MDS)  Type of Referral: External  Date of Referral: 04/29/25  Initial Nurse Navigator Contact: 04/29/25  Referral to Initial Contact Timeline (days): 0  First Appointment Available: 05/06/25  Reason if booked > 7 days after scheduling: Patient request     Treatment  Current Status: Active    Medical Oncologist: Dr. King Garrison    Procedures: Biopsy; CT  Biopsy Schedule Date: 01/08/25  CT Schedule Date: 02/26/25    Other Systemic Treatment: Luspatercept Q3W    Support Systems: Spouse/significant other  Barriers of Care: Comorbidities; Transportation  Comorbidities: Hx Charcot-Madelaine-Tooth, COLINDRES, uses cane to ambulate  Transportation Barriers: Lives in [place], [place]  Concerns: Concerned about progression of MDS.     Acuity  Systemic Treatment - predicted or initiated: Other (+1)  Comorbidities in Medical History: 2  Hospitalization Within the Past Month: 0   Needed: 0  Support: 0  Verbalizes Financial Concerns: 0  Transportation: 0  Psychological Factors (+1 each): QOL issues  History of noncompliance/frequent no shows and cancellations: 0  Verbalizes the need for more education: 1  Navigation Acuity: 8     Follow Up  Follow up for [facility] oncology navigation needs.

## 2025-04-30 ENCOUNTER — TELEPHONE (OUTPATIENT)
Dept: HEMATOLOGY/ONCOLOGY | Facility: CLINIC | Age: 65
End: 2025-04-30
Payer: COMMERCIAL

## 2025-04-30 NOTE — NURSING
Called Mr. Viramontes to discuss scheduling new patient visit with Dr. Powell vs Dr. Garrison. Mr. Viramontes prefers to meet with a specialist who treats blood disorders and would like to meet with Dr. Garrison.     Informed that first available in-person visit in [place] is 6/4 at 8AM. He v/u and agreed to this visit.     [facility] [place] to ILENE Jamison MA to schedule.     Awaiting previous medical records from Stephanie Trejo.

## 2025-05-09 ENCOUNTER — DOCUMENTATION ONLY (OUTPATIENT)
Dept: HEMATOLOGY/ONCOLOGY | Facility: CLINIC | Age: 65
End: 2025-05-09
Payer: COMMERCIAL

## 2025-06-04 ENCOUNTER — OFFICE VISIT (OUTPATIENT)
Dept: PULMONOLOGY | Facility: CLINIC | Age: 65
End: 2025-06-04
Payer: MEDICARE

## 2025-06-04 VITALS
DIASTOLIC BLOOD PRESSURE: 51 MMHG | OXYGEN SATURATION: 95 % | HEIGHT: 72 IN | BODY MASS INDEX: 29.59 KG/M2 | SYSTOLIC BLOOD PRESSURE: 97 MMHG | WEIGHT: 218.5 LBS | HEART RATE: 57 BPM

## 2025-06-04 DIAGNOSIS — G70.89 NEUROMUSCULAR LUNG DISEASE: ICD-10-CM

## 2025-06-04 DIAGNOSIS — J99 NEUROMUSCULAR LUNG DISEASE: ICD-10-CM

## 2025-06-04 DIAGNOSIS — G71.00 MUSCULAR DYSTROPHY: ICD-10-CM

## 2025-06-04 DIAGNOSIS — R06.02 SHORTNESS OF BREATH: ICD-10-CM

## 2025-06-04 DIAGNOSIS — M62.838 MUSCLE SPASM: Primary | ICD-10-CM

## 2025-06-04 PROCEDURE — 99214 OFFICE O/P EST MOD 30 MIN: CPT | Mod: PBBFAC,PO | Performed by: INTERNAL MEDICINE

## 2025-06-04 PROCEDURE — 99999 PR PBB SHADOW E&M-EST. PATIENT-LVL IV: CPT | Mod: PBBFAC,,, | Performed by: INTERNAL MEDICINE

## 2025-06-04 PROCEDURE — 99213 OFFICE O/P EST LOW 20 MIN: CPT | Mod: S$PBB,,, | Performed by: INTERNAL MEDICINE

## 2025-06-04 RX ORDER — HYDROXYZINE PAMOATE 25 MG/1
CAPSULE ORAL
COMMUNITY
End: 2025-06-06

## 2025-06-04 RX ORDER — ALFUZOSIN HYDROCHLORIDE 10 MG/1
10 TABLET, EXTENDED RELEASE ORAL
COMMUNITY
Start: 2025-05-19

## 2025-06-04 RX ORDER — ONDANSETRON 8 MG/1
8 TABLET, ORALLY DISINTEGRATING ORAL 3 TIMES DAILY
COMMUNITY
Start: 2025-02-07 | End: 2025-06-06

## 2025-06-04 RX ORDER — PROCHLORPERAZINE MALEATE 10 MG
TABLET ORAL
COMMUNITY
Start: 2025-05-12

## 2025-06-04 RX ORDER — TRAMADOL HYDROCHLORIDE 50 MG/1
TABLET, FILM COATED ORAL
COMMUNITY
End: 2025-06-06

## 2025-06-04 RX ORDER — HYDRALAZINE HYDROCHLORIDE 10 MG/1
TABLET, FILM COATED ORAL
COMMUNITY
Start: 2025-06-03 | End: 2025-06-06

## 2025-06-04 RX ORDER — BUTALBITAL, ACETAMINOPHEN AND CAFFEINE 50; 325; 40 MG/1; MG/1; MG/1
TABLET ORAL
COMMUNITY
Start: 2025-03-07 | End: 2025-06-06

## 2025-06-04 RX ORDER — LOSARTAN POTASSIUM 50 MG/1
50 TABLET ORAL
COMMUNITY
Start: 2025-05-16

## 2025-06-04 RX ORDER — FLUTICASONE PROPIONATE 50 MCG
SPRAY, SUSPENSION (ML) NASAL
COMMUNITY
Start: 2025-04-14 | End: 2025-06-06

## 2025-06-05 ENCOUNTER — TELEPHONE (OUTPATIENT)
Dept: HEMATOLOGY/ONCOLOGY | Facility: CLINIC | Age: 65
End: 2025-06-05
Payer: COMMERCIAL

## 2025-06-06 ENCOUNTER — OFFICE VISIT (OUTPATIENT)
Dept: HEMATOLOGY/ONCOLOGY | Facility: CLINIC | Age: 65
End: 2025-06-06
Payer: MEDICARE

## 2025-06-06 ENCOUNTER — LAB VISIT (OUTPATIENT)
Dept: LAB | Facility: HOSPITAL | Age: 65
End: 2025-06-06
Attending: INTERNAL MEDICINE
Payer: MEDICARE

## 2025-06-06 VITALS
WEIGHT: 210.56 LBS | RESPIRATION RATE: 16 BRPM | OXYGEN SATURATION: 96 % | HEIGHT: 72 IN | HEART RATE: 64 BPM | BODY MASS INDEX: 28.52 KG/M2 | DIASTOLIC BLOOD PRESSURE: 56 MMHG | SYSTOLIC BLOOD PRESSURE: 106 MMHG

## 2025-06-06 DIAGNOSIS — C94.6 MDS/MPN (MYELODYSPLASTIC/MYELOPROLIFERATIVE NEOPLASMS): Primary | ICD-10-CM

## 2025-06-06 DIAGNOSIS — Z79.899 DRUG THERAPY: ICD-10-CM

## 2025-06-06 DIAGNOSIS — R53.83 FATIGUE, UNSPECIFIED TYPE: ICD-10-CM

## 2025-06-06 DIAGNOSIS — D46.9 MDS (MYELODYSPLASTIC SYNDROME): ICD-10-CM

## 2025-06-06 LAB
CORTIS SERPL-MCNC: 9.1 UG/DL (ref 3.1–16.7)
CRP SERPL-MCNC: 7.2 MG/L
ERYTHROCYTE [SEDIMENTATION RATE] IN BLOOD BY PHOTOMETRIC METHOD: 14 MM/HR

## 2025-06-06 PROCEDURE — 82533 TOTAL CORTISOL: CPT

## 2025-06-06 PROCEDURE — 99999 PR PBB SHADOW E&M-EST. PATIENT-LVL IV: CPT | Mod: PBBFAC,,, | Performed by: INTERNAL MEDICINE

## 2025-06-06 PROCEDURE — 85652 RBC SED RATE AUTOMATED: CPT

## 2025-06-06 PROCEDURE — 82024 ASSAY OF ACTH: CPT

## 2025-06-06 PROCEDURE — 36415 COLL VENOUS BLD VENIPUNCTURE: CPT

## 2025-06-06 PROCEDURE — 99214 OFFICE O/P EST MOD 30 MIN: CPT | Mod: PBBFAC,PO | Performed by: INTERNAL MEDICINE

## 2025-06-06 PROCEDURE — 86140 C-REACTIVE PROTEIN: CPT

## 2025-06-06 PROCEDURE — 83520 IMMUNOASSAY QUANT NOS NONAB: CPT

## 2025-06-06 RX ORDER — METHYLPREDNISOLONE 4 MG/1
4 TABLET ORAL DAILY
Qty: 21 EACH | Refills: 0 | Status: SHIPPED | OUTPATIENT
Start: 2025-06-06 | End: 2026-06-06

## 2025-06-06 RX ORDER — DOXYCYCLINE HYCLATE 50 MG/1
50 CAPSULE, GELATIN COATED ORAL 2 TIMES DAILY
COMMUNITY
Start: 2025-06-04

## 2025-06-06 RX ORDER — ASPIRIN 81 MG/1
81 TABLET ORAL DAILY
COMMUNITY

## 2025-06-06 RX ORDER — NEOMYCIN SULFATE, POLYMYXIN B SULFATE, AND DEXAMETHASONE 3.5; 10000; 1 MG/G; [USP'U]/G; MG/G
OINTMENT OPHTHALMIC
COMMUNITY
Start: 2025-06-04

## 2025-06-09 LAB — IL6 SERPL-MCNC: 4.5 PG/ML

## 2025-06-10 LAB
ACTH (OHS): 18 PG/ML
AR INTERLEUKIN 2 RECEPTOR, SOLUBLE, SERUM: 879 PG/ML

## 2025-06-26 ENCOUNTER — TELEPHONE (OUTPATIENT)
Dept: HEMATOLOGY/ONCOLOGY | Facility: CLINIC | Age: 65
End: 2025-06-26
Payer: COMMERCIAL

## 2025-06-27 ENCOUNTER — OFFICE VISIT (OUTPATIENT)
Dept: HEMATOLOGY/ONCOLOGY | Facility: CLINIC | Age: 65
End: 2025-06-27
Payer: MEDICARE

## 2025-06-27 DIAGNOSIS — C94.6 MDS/MPN (MYELODYSPLASTIC/MYELOPROLIFERATIVE NEOPLASMS): ICD-10-CM

## 2025-06-27 DIAGNOSIS — R53.83 FATIGUE, UNSPECIFIED TYPE: Primary | ICD-10-CM

## 2025-06-27 DIAGNOSIS — Z79.899 DRUG THERAPY: ICD-10-CM

## 2025-06-27 NOTE — PROGRESS NOTES
The patient location is: [place]  The chief complaint leading to consultation is: f/u    Visit type: audiovisual    Face to Face time with patient: 31  41 minutes of total time spent on the encounter, which includes face to face time and non-face to face time preparing to see the patient (eg, review of tests), Obtaining and/or reviewing separately obtained history, Documenting clinical information in the electronic or other health record, Independently interpreting results (not separately reported) and communicating results to the patient/family/caregiver, or Care coordination (not separately reported).         Each patient to whom he or she provides medical services by telemedicine is:  (1) informed of the relationship between the physician and patient and the respective role of any other health care provider with respect to management of the patient; and (2) notified that he or she may decline to receive medical services by telemedicine and may withdraw from such care at any time.      PATIENT: Lore Viramontes  MRN: [id number]  DATE: 6/27/2025    Diagnosis:   1. Fatigue, unspecified type    2. MDS/MPN (myelodysplastic/myeloproliferative neoplasms)    3. Drug therapy        Chief Complaint: Follow-up      Oncologic History:     66yo man who is diagnosed with MDS/MPN overlap, here for second opinion. He was diagnosed with a bone marrow biopsy in January 2025 showing hypercellular marrow with dyserythropoiesis and megaloblastoid changes, megakaryocytic expansion with megakaryocytic atypia and clustering, and increased ring sideroblasts (greater than 15%). He was started on luspatercept and follows with Dr. Cardoso in [place].         Subjective:      History of Present Illness: Mr. Viramontes is a 65 y.o. male who presents for evaluation and management of MDS.    Recently diagnosed with MDS with ringed sideroblasts. He was started on luspatercept around January or February. Prior to that he did require blood  transfusion once. Since starting he hasn't required any further transfusions. He reports he has been feeling extremely fatigued, noting severe dyspnea on exertion.  He went to the ED on 5/8/25 for chest pain. CTA was negative for PE. He was ruled out for ACS at that time. He had an angiogram earlier this week and was told it was okay. He had an echocardiogram in February at his cardiologist's office.   He reports feeling hot at says his temp runs over 100 sometimes, but denies night sweats. He reports his appetite is poor and he only eats because he knows he needs to. He reports back pain and also feeling weak.  He is careful to note that he doesn't feel any worse since starting the luspatercept but clearly expresses that he does not feel any better despite improvements in his hemoglobin levels. He also reports some leg swelling.     INTERVAL 6/27/25  Mr. Viramontes participates remotely in telemedicine follow-up today.       He says that the steroids didn't really change his symptoms at all. His dose of luspatercept was reduced and the last time he got treatment he also got a procrit shot. He does report that the fatigue preceded to start of luspatercept and doesn't seem to have changed with the treatment--it did slightly improve at the beginning of treatment but hasn't improved at all since that time.   We did discuss sleep--he states his sleep has improved with use of cannabis at bedtime. He has an inspire implant for sleep apnea which he uses at night and his wife says it helps with that.   He states that he gets out of breath if he does any sort of physical exercise. He did do physical therapy twice a week for about 6 weeks but that didn't really seem to help at all.   He has been back to see his cardiologist (Dr. Downs) and scheduled to see him again on Monday.   He has also seen a pulmonologist (Dr. Garcia)  His biggest issue is dyspnea on exertion limiting his functional capacity. He does report occasional cough.  It sounds like he has had left heart cath recently but not sure if he has had a right heart cath.             Past medical, surgical, family, and social histories have been reviewed and updated below.    Past Medical History:   Past Medical History:   Diagnosis Date    Anticoagulant long-term use     Arthritis     Back pain     Blurred vision     Cancer     skin cancer to top of head    Cervical disc disorder     degeneration and herniation and stenosis    Charcot-Madelaine-Tooth atrophy     LEGS AND ARMS    Coronary artery disease     Gallbladder disease     Hearing loss of both ears     Hypertension     Neck pain on right side     radiation down right arm    Prostate enlargement     Ringing in ears     Sleep apnea     does not use CPAP       Past Surgical History:   Past Surgical History:   Procedure Laterality Date    ABSCESS DRAINAGE Left 11/26/2024    Procedure: DRAINAGE, ABSCESS;  Surgeon: JEROME Cole MD;  Location: [facility] OR;  Service: General;  Laterality: Left;    BACK SURGERY      CATARACT EXTRACTION  2016    CHOLECYSTECTOMY  2010    CYST REMOVAL  2018    rectal    CYST REMOVAL Left 11/26/2024    Procedure: EXCISION, CYST, NECK;  Surgeon: JEROME Cole MD;  Location: [facility] OR;  Service: General;  Laterality: Left;    ENDOSCOPIC NASAL CAUTERIZATION Bilateral 06/15/2023    Procedure: CAUTERIZATION, NOSE, ENDOSCOPIC;  Surgeon: Stef Denise MD;  Location: [facility] OR;  Service: ENT;  Laterality: Bilateral;    ENDOSCOPIC NASAL SEPTOPLASTY N/A 01/14/2023    Procedure: SEPTOPLASTY, NOSE, ENDOSCOPIC;  Surgeon: Stef Denise MD;  Location: [facility] OR;  Service: ENT;  Laterality: N/A;    EYE SURGERY      cataract OU    FLEXIBLE LARYNGOSCOPY WITH DRUG-INDUCED SEDATION FOR EVALUATION OF SLEEP APNEA N/A 01/14/2023    Procedure: LARYNGOSCOPY, FLEXIBLE, WITH DRUG-INDUCED SEDATION, FOR SLEEP APNEA ASSESSMENT;  Surgeon: Stef Denise MD;  Location: [facility] OR;  Service: ENT;  Laterality: N/A;    FUNCTIONAL ENDOSCOPIC  SINUS SURGERY (FESS) USING COMPUTER-ASSISTED NAVIGATION Bilateral 06/14/2023    Procedure: FESS, USING COMPUTER-ASSISTED NAVIGATION;  Surgeon: Stef Denise MD;  Location: [facility];  Service: ENT;  Laterality: Bilateral;    HEMORRHOID SURGERY  1994    INSERTION, NEUROSTIMULATOR, HYPOGLOSSAL N/A 06/14/2023    Procedure: INSERTION,NEUROSTIMULATOR,HYPOGLOSSAL;  Surgeon: Stef Denise MD;  [facility];  Service: ENT;  Laterality: N/A;    Inspire implant for sleep apnea N/A 06/2023    LEFT HEART CATHETERIZATION N/A 08/04/2021    Procedure: Left heart cath;  Surgeon: Steve Dvaey III, MD;  Location: [facility];  Service: Cardiology;  Laterality: N/A;    NASAL TURBINATE REDUCTION N/A 01/14/2023    Procedure: REDUCTION, NASAL TURBINATE;  Surgeon: Stef Denise MD;  Location: [facility];  Service: ENT;  Laterality: N/A;    NECK SURGERY      right elbow bursa removal      SURGICAL REMOVAL OF PILONIDAL CYST N/A 12/22/2021    Procedure: EXCISION, PILONIDAL CYST;  Surgeon: Simon Mojica MD;  Location: [facility];  Service: General;  Laterality: N/A;    urolift         Family History:   Family History   Problem Relation Name Age of Onset    Heart disease Mother      Heart attack Mother      Diabetes Mother      Cancer Father      Hypertension Sister      Diabetes Brother      Heart attack Maternal Grandfather      Heart disease Maternal Grandfather      Stroke Maternal Grandfather      Heart attack Paternal Grandmother      Heart disease Paternal Grandmother      Stroke Paternal Grandfather         Social History:  reports that he quit smoking about 7 years ago. His smoking use included cigarettes. He has never been exposed to tobacco smoke. He has never used smokeless tobacco. He reports that he does not currently use drugs. He reports that he does not drink alcohol.    Allergies:  Review of patient's allergies indicates:   Allergen Reactions    Tuberculin ppd      Erroneous positive result.         Medications:  Current Medications[1]    Review of Systems  A comprehensive review of systems was performed; pertinent positives and negatives are noted in the HPI.    ECOG Performance Status:   ECOG SCORE    3 - Capable of only limited selfcare, confined to bed or chair more than 50% of waking hours         Objective:      Vitals:   There were no vitals filed for this visit.    BMI: There is no height or weight on file to calculate BMI.    Physical Exam  Vitals and nursing note reviewed.   Constitutional:       General: He is not in acute distress.     Appearance: Normal appearance. He is normal weight. He is not toxic-appearing.   HENT:      Head: Normocephalic and atraumatic.   Eyes:      General: No scleral icterus.     Conjunctiva/sclera: Conjunctivae normal.   Cardiovascular:      Rate and Rhythm: Normal rate and regular rhythm.      Heart sounds: Normal heart sounds. No murmur heard.  Pulmonary:      Effort: Pulmonary effort is normal.      Breath sounds: Normal breath sounds. No wheezing, rhonchi or rales.   Abdominal:      General: Abdomen is flat. Bowel sounds are normal.      Palpations: Abdomen is soft.      Tenderness: There is no abdominal tenderness.   Musculoskeletal:         General: No tenderness or deformity.      Cervical back: Neck supple.   Lymphadenopathy:      Cervical: No cervical adenopathy.   Skin:     General: Skin is warm and dry.      Coloration: Skin is not jaundiced.      Findings: No bruising or erythema.   Neurological:      General: No focal deficit present.      Mental Status: He is alert and oriented to person, place, and time. Mental status is at baseline.   Psychiatric:         Mood and Affect: Mood normal.         Behavior: Behavior normal.         Thought Content: Thought content normal.         Laboratory Data:  Tissue Specimen to Pathology, Bone Marrow Aspiration/Biopsy Procedure  Order: [id number]   Status: Edited Result - FINAL       Next appt: 06/27/2025 at 08:00 AM  in Hematology and Oncology (King Garrison MD)    Test Result Released: Yes (seen)    0 Result Notes        Narrative  Performed by: [facility]  Patient - FARHAT STOLL - 1960 Sex - M  Med Rec # - [id number]                            Cirilo Wlalace MD  The following is an electronic copy of report # [id number] from:  [facility]  [address] [place] [address]  Phone [phone]  Addendum Report  ADDENDUM:    2025 JOE/rica/rosendo    Please see [facility] results at bottom of   report.    Addendum Report Verified By Paul Ramirez MD, FCAP on 2025   09:50 PM    [facility], [facility] *  [address]. * [place], [place]   [id number] Report  FINAL DIAGNOSIS:    2025 JOE/rush    BONE MARROW, RIGHT ILIAC CREST, ASPIRATE, CLOT SECTION, AND CORE   BIOPSY:--MYELOID NEOPLASM CHARACTERIZED BY HYPERCELLULAR MARROW     (APPROXIMATELY 80% TO 85%) WITH TRILINEAGE HEMATOPOIETIC ELEMENTS,     ERYTHROID EXPANSION WITH DYSERYTHROPOIESIS AND MEGALOBLASTOID     CHANGES, MEGAKARYOCYTIC EXPANSION WITH MEGAKARYOCYTIC ATYPIA     AND CLUSTERING, AND INCREASED RING SIDEROBLASTS (GREATER THAN 15%)     [SEE COMMENT].    --PERIPHERAL BLOOD WITH THROMBOCYTOSIS (532,000/MICROLITER); ANEMIA     (HEMOGLOBIN 7.7 GRAM/DECILITER), WITH FEW NUCLEATED ERYTHROCYTES;     AND NORMAL LEUKOCYTE COUNT (9,590/MICROLITER), WITH MOSTLY UNREMARKABLE     DIFFERENTIAL COUNT AND MORPHOLOGY.    Comment:    1. The overall findings by morphology are indicative of a myeloid   neoplasm. Given the overall constellation of features   (hypercellularity, erythroid expansion with dyserythropoiesis,   megakaryocytic expansion and atypia, increased ring sideroblasts, along   with peripheral blood demonstrating anemia and thrombocytosis),   myelodysplastic/myelooproliferative neoplasm with ring sideroblasts and   thrombocytosis (MDS/MPN-RS-T) is the favored  "entity, regarding   subclassification. (It should be noted that the current 2024 World   Health Organization [WHO] Classification uses the terminology   "myelodysplastic/myeloproliferative neoplasm with SF3B1 mutation and   thrombocytosis [MDS/MPN-ZC1P8-F]," with demonstration of SF3B1 mutation   required.) Other differential diagnostic considerations, regarding   subclassification of this myeloid neoplasm, would include   myeloproliferative neoplasm, not otherwise specified, with ring   sideroblasts; or mixed myelodysplastic/myeloproliferative neoplasm, not   otherwise specified, with ring sideroblasts. Considering the overall   findings (including increased ring sideroblasts), a pure essential   thrombocythemia (ET) is unlikely. There is no increase of blasts (less   than 2%) or significant reticulin fibrosis (MF-0). Overall, correlation   with clinicoradiologic findings (including evaluation for organomegaly,   etc), hematologic parameters (including hematologic historic data,   therapy history, etc), and laboratory studies (including BCR/ABL   testing, JAK2 mutation study, calreticulin, MPL, etc.) would be needed   for further potential characterization. Correlation with pending   ancillary studies additionally is recommended (see below Comment #5).    2. Please see separate report for bone marrow flow cytometric evaluation   ([id number]; 01/08/2025).    3. By report from outside facility, cytogenetic studies revealed   46,XY[20] normal male chromosomal complement. For descriptive   analysis/details, please see that separate Chromosome Analysis Report   ([id number]; 01/13/2025) from [facility] [place], [place].    4. Also by report from outside facility, fluorescence in-situ   hybridization [FISH] studies (MDS Panel) was reported as follows: "This   is a normal study. No abnormal single patterns were found for the   probed chromosome regions of the MDS panel (5/5q, 7/7q, 8, 20q, " "MLL)."   For additional details, please see that separate Summary of MDS FISH   Reports ([id number]; 01/13/2025) from [facility] [place], [place].    5. Next Generation Sequencing [NGS] studies (Myeloid Disorders Profile)   are pending at extradepartmental facility. Results will be reported in   subsequent addendum.    Addendum Report Verified By Paul Ramirez MD, FCRHODA on 01/20/2025   11:08 AM    [facility], [facility] *   [facility]. * [place]   [id number]  THE ORIGINAL REPORT BELOW WAS VERIFIED BY Paul Ramirez MD, LISA   ON 01/10/2025 11:59 PM    DIAGNOSIS:  01/10/2025 JWH/rosendo,pjl    BONE MARROW, RIGHT ILIAC CREST, ASPIRATE, CLOT SECTION, AND CORE BIOPSY:    --PENDING ADDITIONAL STAINS; FINAL DIAGNOSIS TO FOLLOW.    --PERIPHERAL BLOOD WITH THROMBOCYTOSIS (532,000/MICROLITER); ANEMIA     (HEMOGLOBIN 7.7 GRAM/DECILITER); AND NORMAL LEUKOCYTE COUNT     (9,590/MICROLITER)._______________________________________________________________________________________________________    SPECIMEN AND SOURCE:  1. Right Bone Marrow Core  2. Right Bone Marrow Clot    BONE MARROW PROCEDURE:  Bone marrow aspiration and biopsy    Site: Right iliac crest    CLINICAL INFORMATION:  Clinical Information:- and gt;Anemia, unspecified    Acquired pancytopenia, folate-deficiency anemia, chronic zinc deficiency    GROSS EXAMINATION:  01/08/2025 AF/WPL/sdc    1. Received in a formalin-filled container labeled with the patient's   name and MRN, and designated "right bone marrow core," is a 0.2 cm core   of red-brown bone tissue measuring 0.9 cm in length. The specimen is   filtered and entirely submitted in cassette 1A following   decalcification process.    2. Received in a formalin-filled container labeled with the patient's   name and MRN, and designated "right bone marrow clot," are two portions   of coagulated blood measuring 1.8 x 0.9 x 0.8 cm and 1.5 x 1.3 x 0.8   cm. The " specimen is entirely submitted in cassettes 2A and 2B.Following   review of the routine H and amp;amp;E slides, the pathologist deemed it   medically necessary to perform immunohistochemical stains and/or   special stains so that a complete and accurate diagnosis could be   reported to the treating physician.    CODE: 0    Pathologist: Paul Ramirez MD, FCAP (Electronic Signature)   01/10/2025 11:58 PM .    [facility], [facility] * [address] [facility] [facility]. * [place]   [id number].  Technical services performed at the following location(s): [facility], [facility] * [address] [place] [address] * [place] [address].  Gross examination performed at: [facility] * [address] [place] [address] * [place] [address].    MICROSCOPIC DESCRIPTION:    PERIPHERAL BLOOD SMEAR:    Red blood cells show mild-to-moderate anisopoikilocytosis, with a   somewhat dimorphic pattern demonstrated. Macrocytic forms are noted,   admixed with polychromatophilic elements, scattered dacrocytes,   occasional codocytes, and few elliptocytes. Few nucleated erythrocytes   are identified.    White blood cells are normal in number and consist mostly of neutrophils   and lymphocytes. Mature neutrophils demonstrate mostly unremarkable   lobation; there is no significant decrease of cytoplasmic granularity.   Occasional monocytes and occasional eosinophils are present in   background. Basophils are not significantly increased (by morphologic   assessment). No definitive blasts are identified.    Platelets are increased.    BONE MARROW ASPIRATE:    Aspirate demonstrates adequately cellular spicules. Trilineage   hematopoietic elements are represented. There is an expansion of   erythropoiesis. Dyserythropoietic changes are noted and are manifested   as binucleate/multinucleate erythroid forms or cells with irregular   nuclear contours/nuclear budding. Megaloblastoid changes additionally   are present. Myeloid lineage demonstrates  varying stages of maturation.   Myeloblasts are not increased (less than 2%). Megakaryocytes are   increased and demonstrate atypical lobation, including hyperlobate   forms. There is no increase of lymphocytes are plasma cells.    BONE MARROW CLOT SECTION: Clot section shows cellular marrow. Trilineage   hematopoietic elements are represented. There is an expansion of   erythroid elements. Megakaryocytes also are increased and demonstrate   atypia, including hyperlobulation. There are foci of loose clustering   of megakaryocytes. No bizarre megakaryocytes are identified. No   well-demonstrated lymphoid aggregates are noted.    Reticulin stain is performed on clot section (block 2A), in an attempt   to evaluate for presence or absence of reticulin fibrosis; control   stains appropriately. Reticulin stain demonstrates no significant   increase of reticulin fibers (MF-0).    BONE MARROW CORE BIOPSY:    Core biopsy demonstrates hypercellular marrow (approximately 80% to   85%). Trilineage hematopoietic elements are represented. There is an   expansion of erythroid elements. Megakaryocytes also are increased and   demonstrate atypia, including hyperlobulation. There are foci of loose   clustering of megakaryocytes. No bizarre megakaryocytes are identified.   No well-demonstrated lymphoid aggregates are noted.    Reticulin stain is performed on core biopsy (block 1A) in an attempt to   evaluate for presence or absence of reticulin fibrosis; control stains   appropriately. Reticulin stain demonstrates no significant increase of   reticulin fibers (MF-0).    IRON STAIN:    Iron stain performed on aspirate smear demonstrates stainable iron   present. Increased ring sideroblasts are identified (greater than 15%).   Iron stain performed on clot section (blocks 2A and 2B) demonstrates   stainable iron present. Iron stain performed on core biopsy (block 1A)   is mostly non-contributory. Control stains appropriately.    ADDENDUM  REPORT:    02/03/2025 [facility]/tw    [facility] Myeloid Disorders    SNVs/Indels ASXL1 E788*; SETBP1 I871T; SF3B1 K666N; SRSF2 P95R  RNA Fusions See Interpretation  Copy Number Variations (CNVs) NONE Detected  Pertinent Negatives NO alterations detected in the following genes:CALR,   FLT3, IDH1, IDH2, JAK2, MPL, NPM1, TP53    Interpretation:    - NEXT GENERATION SEQUENCING (NGS) RNA analysis: Quality andor Quantity   not sufficient (QNS) - After multiple attempts, we are unable to obtain   reliable results for RNA sequencing due to failure to meet minimum    metrics. This may be due to the presence of an   interfering substance/inhibitor or poor quality RNA. If clinically   indicated, please submit another block/specimen, if available.    - ASXL1 mutations can be seen in various myeloid neoplasms, and are   associated with a poor prognosis in MDS, AML, MPN and CMML.    - SETBP1 mutations can be seen in various myeloid neoplasms, and are   associated with disease progression in MDS and a poor prognosis in   CMML.    - SF3B1 mutations can be seen in various myeloid neoplasms and are   strongly associated with ring sideroblasts. SF3B1 mutations are   associated with a favorable prognosis in MDS and a poor prognosis in   AML and ET.    - SRSF2 mutations can be seen in various myeloid neoplasms, and are   associated with a poor prognosis in MDS, AML, PMF, and PV.    Performing location: The Accessioning Component of this test was   performed at [facility] , [address] [place], [place] [address] [place], [place] /   [address] / [phone] / CLIA # [id number] / (s):   Darling Seo M.D. The Technical Component Processing and Analysis   of this test was completed at [facility] [address] / [address] / [phone] / CLIA # [id number] / Laboratory   Director(s): Carlos Weinberg M.D. The Professional Component of   this test was completed at [facility]  [address] [facility],   [address], [place], [place] / [address] / [phone] / CLIA # [id number] /   (s): Valeria Briceno M.D. Analysis Code(s): EMPLOYEE-3-4439   Interpretation Code(s): [id number]    End of Report   Specimen Collected: 01/08/25 09:48 CST Last Resulted: 02/03/25 21:53 CST           Imaging: CT Abdomen Pelvis With IV Contrast NO Oral Contrast  Order: [id number]   Status: Final result       Next appt: 06/27/2025 at 08:00 AM in Hematology and Oncology (King Garrison MD)    Test Result Released: Yes (not seen)    0 Result Notes  Details    Reading Physician Reading Date Result Priority   Javon Galeano MD  [phone] 5/9/2025 STAT   Fidencio Rosales MD  [phone]  5/9/2025      Narrative & Impression  EXAMINATION:  CT ABDOMEN PELVIS WITH IV CONTRAST     CLINICAL HISTORY:  Epigastric pain     TECHNIQUE:  Axial CT slices through the abdomen and pelvis were obtained after the administration of intravenous contrast.  Approximately 80 mL of Omnipaque 350 was administered intravenously.  Delayed phase images through the abdomen and pelvis were obtained.  Coronal and sagittal reconstructions were created. Total DLP for the study is approximately 1596 mGy-cm. Automated exposure control was utilized.     COMPARISON:  CT abdomen pelvis 11/26/2024     FINDINGS:  There appear to be minimal nonspecific dependent changes in the left greater than right lung bases.  No evidence of dense consolidation in the included lung bases is visualized.  There is a tiny 2 mm nodule along the right minor fissure on image 6 of series 6.  In low risk patients, no routine follow-up is required according to Fleischner society criteria.  In high-risk patients, optional CT could be obtained in 12 months.  The visualized cardiac base is within normal limits for size.  Atherosclerotic vascular calcifications are seen including coronary calcifications.     The liver demonstrates mild diffuse decreased attenuation which may reflect  fatty infiltration.  Small left hepatic lobe calcification may reflect prior granulomatous disease.  Postoperative changes of cholecystectomy are seen.  The spleen and pancreas appear to be within normal limits.  Mild thickening of the left adrenal gland is nonspecific, possibly related to hyperplasia in stable from the prior study.  There is a 15 mm right adrenal nodule demonstrating macroscopic fat attenuation compatible with a myelolipoma, grossly stable.  There is an 11 x 6 mm in axial diameter calculus at the lower pole left kidney without associated obstruction.  Both kidneys demonstrate no evidence of hydronephrosis or hydroureter.  There is mild grossly symmetric bilateral perinephric stranding which is nonspecific, possibly related to medical-renal disease.  Both kidneys demonstrate a few subcentimeter hypodensities which are too small to adequately characterize, statistically likely reflective of cysts.  The urinary bladder is underdistended.  No gross bladder wall thickening is visualized.  The prostate gland is enlarged measuring 5.7 cm in demonstrates presumed radiation seeds or surgical clips.     Portions of the small and large bowel are underdistended which along with lack of oral contrast limits evaluation.  No dilated loops of bowel suggestive of high-grade obstruction are seen.  A few scattered colonic diverticular seen without adjacent fat stranding to suggest acute diverticulitis by CT.  The appendix appears normal in caliber.  The stomach is decompressed which limits assessment.  No free air or free fluid is visualized.  Fat containing bilateral inguinal hernias are seen, right larger left.  Scattered subcentimeter in short axis dimension mesenteric and retroperitoneal lymph nodes are noted.  Abdominal-pelvic atherosclerosis is seen.  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  Degenerative changes affect the visualized spine.  Postoperative changes of posterior body fusion at the L3-L4 and L4-L5 levels are again seen.  No acute displaced fracture is visualized.     Impression:     1. No definite acute intra-abdominal or pelvic solid organ or bowel abnormality is appreciated by CT.  2. Additional findings and details as above.  3. The findings are concordant with nighthawk.        Electronically signed by:Fidencio Rosales MD  Date:                                            05/09/2025  Time:                                           07:41        Exam Ended: 05/09/25 01:48 CDT Last Resulted: 05/09/25 07:41 CDT     CTA Chest Non-Coronary (PE Studies)  Order: [id number]   Status: Final result       Next appt: 06/27/2025 at 08:00 AM in Hematology and Oncology (King Garrison MD)    Test Result Released: Yes (not seen)    0 Result Notes  Details    Reading Physician Reading Date Result Priority   Javon Galeano MD  [phone] 5/9/2025 STAT   Eddie Barragan MD  [phone]  5/9/2025      Narrative & Impression  EXAMINATION:  CTA CHEST NON CORONARY (PE STUDIES)     CLINICAL HISTORY:  Pulmonary embolism (PE) suspected, unknown D-dimer   chest pain.     TECHNIQUE:  Axial images of the chest were obtained with IV contrast administration.  Coronal and sagittal reconstructions were provided.  Three dimensional and MIP images were obtained and evaluated.  Total DLP was 1596 mGy-cm. Dose lowering technique and automated exposure control were utilized for this exam.     COMPARISON:  Chest radiograph 05/08/2025, CT pulmonary angiogram 02/26/2025.     FINDINGS:  There is no filling defect within the pulmonary arteries through the level of the subsegmental pulmonary arteries.  There is no CT evidence of right heart strain.     The airway is widely patent.  There is no mediastinal or hilar lymphadenopathy.  The heart is not enlarged.  The lung parenchyma is clear.  There is no pulmonary  nodule or mass.  There is no pleural effusion.  There is no ground-glass or reticulonodular airspace opacity.     The upper abdomen demonstrates no acute abnormality.  There is no lytic or blastic osseous lesion.     Impression:     No evidence of pulmonary embolism through the level of the subsegmental pulmonary arteries.  There is no acute abnormality of the chest.     [facility] concordance        Electronically signed by:Eddie Barragan MD  Date:                                            05/09/2025  Time:                                           07:42        Exam Ended: 05/09/25 01:46 CDT Last Resulted: 05/09/25 07:42 CDT            Assessment:       1. Fatigue, unspecified type    2. MDS/MPN (myelodysplastic/myeloproliferative neoplasms)    3. Drug therapy      Mr. 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   I have reviewed his bone marrow biopsy and additional peripheral blood labs and agree with diagnosis as well as management.   Despite significant improvement in his anemia, he remains extremely fatigue. Fatigue was one of the most frequently reported adverse events in clinical trials of luspatercept. Most cases are not severe enough to require discontinuation: In one study, only 1% of patients discontinued luspatercept therapy due to fatigue.    He follows with a pulmonologist and a cardiologist, and further comprehensive evaluation/workup for other systemic causes of fatigue has been noncontributory. At this point I think excluding  luspatercept as the cause of fatigue would be worth considering, though I do think other cardiopulmonary causes are likely contributing.       Plan:     Suggest holding luspatercept for 4-6 weeks and continue PRBC transfusions +retacrit to maintain hemoglobin above 9 given his history of coronary artery disease and ongoing issues with dyspnea, as I think he has limited cardiopulmonary reserve and anemia worse than that is  almost certainly going to cause his symptoms to worsen. If fatigue does NOT improve while luspatercept is being held I think it would be very difficult to argue that it is the cause of fatigue and other causes of fatigue should still be considered. If fatigue improves significantly while luspatercept is on hold then the risk/benefit of continuation would need to be discussed with patient to determine if he would like to continue given the severity of his symptoms.            King Garrison MD, FACP  Hematology/Oncology  [facility] - [address], [address]  [place] [address]  Phone: [phone]  Fax: [phone]      Total time of this visit, including time spent face to face with patient and/or via video/audio, and also in preparing for today's visit for MDM and documentation. (Medical Decision Making, including consideration of possible diagnoses, management options, complex medical record review, review of diagnostic tests and information, consideration and discussion of significant complications based on comorbidities, and discussion with providers involved with the care of the patient)41 minutes. Greater than 50% was spent face to face with the patient counseling and coordinating care.           [1]   Current Outpatient Medications   Medication Sig Dispense Refill    alfuzosin (UROXATRAL) 10 mg Tb24 Take 10 mg by mouth.      aspirin (ECOTRIN) 81 MG EC tablet Take 81 mg by mouth once daily.      clonazePAM (KLONOPIN) 1 MG tablet Take 1 mg by mouth nightly as needed for Anxiety.      doxycycline 50 MG capsule Take 50 mg by mouth 2 (two) times daily.      famotidine (PEPCID) 10 MG tablet Take 10 mg by mouth 2 (two) times daily as needed.      losartan (COZAAR) 50 MG tablet Take 50 mg by mouth.      lubiprostone (AMITIZA) 8 MCG Cap Take 1 capsule (8 mcg total) by mouth 2 (two) times daily with meals. 60 capsule 11    magnesium glycinate 100 mg Tab Take 400 mg by mouth once.       methylPREDNISolone (MEDROL DOSEPACK) 4 mg tablet Take 1 tablet (4 mg total) by mouth once daily. use as directed 21 each 0    nebivoloL (BYSTOLIC) 5 MG Tab Take 1 tablet by mouth once daily.      neomycin-polymyxin-dexamethasone (DEXACINE) 3.5 mg/g-10,000 unit/g-0.1 % Oint       pantoprazole (PROTONIX) 40 MG tablet Take 40 mg by mouth.      prochlorperazine (COMPAZINE) 10 MG tablet Take by mouth.       No current facility-administered medications for this visit.     Facility-Administered Medications Ordered in Other Visits   Medication Dose Route Frequency Provider Last Rate Last Admin    lactated ringers infusion   Intravenous Continuous Sherron Richmond MD 20 mL/hr at 01/14/23 0530 New Bag at 01/14/23 0855    lactated ringers infusion   Intravenous Continuous Simon Pritchett MD   Stopped at 06/14/23 1431

## 2025-06-30 ENCOUNTER — TELEPHONE (OUTPATIENT)
Dept: HEMATOLOGY/ONCOLOGY | Facility: CLINIC | Age: 65
End: 2025-06-30
Payer: COMMERCIAL

## 2025-06-30 ENCOUNTER — DOCUMENTATION ONLY (OUTPATIENT)
Dept: HEMATOLOGY/ONCOLOGY | Facility: CLINIC | Age: 65
End: 2025-06-30
Payer: COMMERCIAL

## 2025-06-30 NOTE — TELEPHONE ENCOUNTER
Copied from CRM #[id number]. Topic: General Inquiry - Patient Advice  >> Jun 30, 2025 10:03 AM Brenda wrote:  Type:  Needs Medical Advice    Who Called: Pts wife Ashlie     Would the patient rather a call back or a response via [facility]? Call back     Best Call Back Number: [phone]    Additional Information: pts wife states the pt had requested to have documents sent from Dr. Morfin office to Dr. Garcia  Pressers office. wife states they are at the other clinic and they still haven't received anything. Wife states she believes this was information on an angiogram. Please call back with further assistance.

## 2025-06-30 NOTE — TELEPHONE ENCOUNTER
Spoke with Ashlie morro wife she was asking about if Dr. Garrison spoke with Dr. Bruce office about an angiogram on the right side? They went there this morning and they said they didn't hear anything. She said they are going to put him on a heart monitor for 3 days to see what that shows. I told her I will reach out to Dr. Garrison and let him know and see if he has tried to reach his office.

## 2025-06-30 NOTE — NURSING
Chart reviewed. Mr. Viramontes has established care with Dr. Garrison. Dr. Garrison has suggested taking a vacation from [facility] to see if fatigue improves.     F/u visit scheduled with Dr. Garrison in August.     No navigation needs identified today.

## 2025-08-22 ENCOUNTER — TELEPHONE (OUTPATIENT)
Dept: HEMATOLOGY/ONCOLOGY | Facility: CLINIC | Age: 65
End: 2025-08-22
Payer: COMMERCIAL

## 2025-08-23 PROBLEM — D46.9 MDS (MYELODYSPLASTIC SYNDROME): Status: ACTIVE | Noted: 2025-08-23

## 2025-08-23 PROBLEM — C94.6 MDS/MPN (MYELODYSPLASTIC/MYELOPROLIFERATIVE NEOPLASMS): Status: ACTIVE | Noted: 2025-08-23

## 2025-08-23 PROBLEM — Z71.89 ACP (ADVANCE CARE PLANNING): Status: ACTIVE | Noted: 2025-08-23

## 2025-08-23 PROBLEM — E78.5 HYPERLIPIDEMIA: Status: ACTIVE | Noted: 2021-10-20

## 2025-08-23 PROBLEM — K21.9 GASTROESOPHAGEAL REFLUX DISEASE: Status: ACTIVE | Noted: 2025-08-23

## 2025-08-23 PROBLEM — K92.2 LOWER GI BLEED: Status: ACTIVE | Noted: 2025-08-23

## 2025-08-23 PROBLEM — D46.9 MDS (MYELODYSPLASTIC SYNDROME): Status: RESOLVED | Noted: 2025-08-23 | Resolved: 2025-08-23

## 2025-08-23 PROBLEM — G60.0 CHARCOT-MARIE-TOOTH DISEASE: Status: ACTIVE | Noted: 2025-08-23

## 2025-08-23 PROBLEM — D64.9 ANEMIA: Status: ACTIVE | Noted: 2025-08-23

## 2025-08-23 PROBLEM — N40.0 BPH (BENIGN PROSTATIC HYPERPLASIA): Status: ACTIVE | Noted: 2025-08-23
